# Patient Record
Sex: FEMALE | Race: WHITE | NOT HISPANIC OR LATINO
[De-identification: names, ages, dates, MRNs, and addresses within clinical notes are randomized per-mention and may not be internally consistent; named-entity substitution may affect disease eponyms.]

---

## 2021-02-13 ENCOUNTER — TRANSCRIPTION ENCOUNTER (OUTPATIENT)
Age: 81
End: 2021-02-13

## 2021-07-12 PROBLEM — Z00.00 ENCOUNTER FOR PREVENTIVE HEALTH EXAMINATION: Status: ACTIVE | Noted: 2021-07-12

## 2021-08-17 ENCOUNTER — RESULT REVIEW (OUTPATIENT)
Age: 81
End: 2021-08-17

## 2021-08-17 ENCOUNTER — APPOINTMENT (OUTPATIENT)
Dept: UROLOGY | Facility: CLINIC | Age: 81
End: 2021-08-17

## 2021-09-10 ENCOUNTER — APPOINTMENT (OUTPATIENT)
Dept: UROLOGY | Facility: CLINIC | Age: 81
End: 2021-09-10
Payer: MEDICARE

## 2021-09-10 VITALS
RESPIRATION RATE: 16 BRPM | SYSTOLIC BLOOD PRESSURE: 195 MMHG | HEART RATE: 53 BPM | DIASTOLIC BLOOD PRESSURE: 74 MMHG | WEIGHT: 122 LBS

## 2021-09-10 DIAGNOSIS — Z86.79 PERSONAL HISTORY OF OTHER DISEASES OF THE CIRCULATORY SYSTEM: ICD-10-CM

## 2021-09-10 DIAGNOSIS — Z80.7 FAMILY HISTORY OF OTHER MALIGNANT NEOPLASMS OF LYMPHOID, HEMATOPOIETIC AND RELATED TISSUES: ICD-10-CM

## 2021-09-10 PROCEDURE — 99203 OFFICE O/P NEW LOW 30 MIN: CPT

## 2021-09-12 PROBLEM — Z86.79 HISTORY OF HYPERTENSION: Status: RESOLVED | Noted: 2021-09-10 | Resolved: 2021-09-12

## 2021-09-12 PROBLEM — Z80.7 FAMILY HISTORY OF HODGKIN'S LYMPHOMA: Status: ACTIVE | Noted: 2021-09-12

## 2021-09-12 RX ORDER — ATENOLOL 50 MG/1
50 TABLET ORAL
Refills: 0 | Status: ACTIVE | COMMUNITY

## 2021-09-12 NOTE — LETTER BODY
[FreeTextEntry1] : Tatiana Mckoy, \par [address]\[place] [address]\par \par Dear Dr. Mckoy,\par \par Isaura Ma presents to the office today.  As you know, she is an 81-year-old woman who was referred for evaluation of microscopic hematuria.  The patient has undergone a urinalysis fairly recently that showed 6 red blood cells per high-power field.  She was advised to have urologic evaluation.  She denies any prior history of gross hematuria or dysuria.  She is not currently having symptoms of urgency, frequency, or incontinence.  She denies suprapubic or perineal pain.  She also denies flank pain, fever, chill, nausea or vomiting.  She has no history of kidney stones.\par \par The patient has history of hypertension and hypothyroidism.  She has prior surgical history of undergoing hysterectomy and BSO about 40 years ago.  She has a prior smoking history that was brief when she was very young and has not smoked for about 55 years.\par \par I reviewed her history of urinalyses.  She has a most recent urinalysis which is negative for blood and she has a track record of undergoing multiple urinalyses over the last few years.  There was only one that showed the 6 red blood cells and on that same urinalysis, there was the presence of white blood cells suggesting possible inflammation at the time.  Given that she is completely asymptomatic and without any prior history of gross hematuria, I think it would be a relatively soft call to initiate hematuria work-up at this time and I think that observation here is most appropriate.  I would recommend that she can continue to undergo screening with urinalysis on a periodic basis but I do not think she needs to undergo either imaging or cystoscopy at this time.\par \par I did advise her that if she is to have any findings in the future of gross hematuria, I would certainly see her back for imaging and cystoscopy evaluation.  If she has repetitive issues of microscopic hematuria, that work-up may also be indicated but I do not see enough indication at this time to pursue it.\par \par Thank you very much for the kind referral and please do not hesitate to contact me with any questions or concerns.\par \par Sincerely,\par \par \par \par \par Willian Sanchez MD, FACS\par  of Urology\par Residency \par [facility]\par \par [facility]\par Director of Robotics and Minimally Invasive Surgery\par [address] [place] [address]\par [place] [id number]\par P: [phone]\par F: [phone]\par [facility]

## 2022-02-15 ENCOUNTER — EMERGENCY (EMERGENCY)
Facility: HOSPITAL | Age: 82
LOS: 1 days | Discharge: HOME | End: 2022-02-15
Attending: EMERGENCY MEDICINE
Payer: COMMERCIAL

## 2022-02-15 ENCOUNTER — APPOINTMENT (OUTPATIENT)
Dept: VASCULAR SURGERY | Facility: CLINIC | Age: 82
End: 2022-02-15
Payer: MEDICARE

## 2022-02-15 VITALS
HEIGHT: 61.5 IN | WEIGHT: 126.1 LBS | RESPIRATION RATE: 18 BRPM | HEART RATE: 62 BPM | SYSTOLIC BLOOD PRESSURE: 164 MMHG | OXYGEN SATURATION: 99 % | DIASTOLIC BLOOD PRESSURE: 76 MMHG | TEMPERATURE: 98 F

## 2022-02-15 VITALS
WEIGHT: 126 LBS | HEART RATE: 54 BPM | BODY MASS INDEX: 23.19 KG/M2 | HEIGHT: 62 IN | SYSTOLIC BLOOD PRESSURE: 139 MMHG | DIASTOLIC BLOOD PRESSURE: 76 MMHG | TEMPERATURE: 96.62 F

## 2022-02-15 DIAGNOSIS — R93.89 ABNORMAL FINDINGS ON DIAGNOSTIC IMAGING OF OTHER SPECIFIED BODY STRUCTURES: ICD-10-CM

## 2022-02-15 LAB
ALBUMIN SERPL ELPH-MCNC: 4 G/DL — SIGNIFICANT CHANGE UP (ref 3.3–5)
ALP SERPL-CCNC: 65 U/L — SIGNIFICANT CHANGE UP (ref 40–120)
ALT FLD-CCNC: 13 U/L — SIGNIFICANT CHANGE UP (ref 10–45)
ANION GAP SERPL CALC-SCNC: 12 MMOL/L — SIGNIFICANT CHANGE UP (ref 5–17)
APTT BLD: 24.8 SEC — LOW (ref 27.5–35.5)
AST SERPL-CCNC: 21 U/L — SIGNIFICANT CHANGE UP (ref 10–40)
BASOPHILS # BLD AUTO: 0.07 K/UL — SIGNIFICANT CHANGE UP (ref 0–0.2)
BASOPHILS NFR BLD AUTO: 0.9 % — SIGNIFICANT CHANGE UP (ref 0–2)
BILIRUB SERPL-MCNC: 0.4 MG/DL — SIGNIFICANT CHANGE UP (ref 0.2–1.2)
BUN SERPL-MCNC: 18 MG/DL — SIGNIFICANT CHANGE UP (ref 7–23)
CALCIUM SERPL-MCNC: 9.1 MG/DL — SIGNIFICANT CHANGE UP (ref 8.4–10.5)
CHLORIDE SERPL-SCNC: 103 MMOL/L — SIGNIFICANT CHANGE UP (ref 96–108)
CO2 SERPL-SCNC: 24 MMOL/L — SIGNIFICANT CHANGE UP (ref 22–31)
CREAT SERPL-MCNC: 0.84 MG/DL — SIGNIFICANT CHANGE UP (ref 0.5–1.3)
EOSINOPHIL # BLD AUTO: 0.49 K/UL — SIGNIFICANT CHANGE UP (ref 0–0.5)
EOSINOPHIL NFR BLD AUTO: 6.6 % — HIGH (ref 0–6)
GLUCOSE SERPL-MCNC: 92 MG/DL — SIGNIFICANT CHANGE UP (ref 70–99)
HCT VFR BLD CALC: 37.3 % — SIGNIFICANT CHANGE UP (ref 34.5–45)
HGB BLD-MCNC: 11.8 G/DL — SIGNIFICANT CHANGE UP (ref 11.5–15.5)
IMM GRANULOCYTES NFR BLD AUTO: 0.5 % — SIGNIFICANT CHANGE UP (ref 0–1.5)
INR BLD: 0.93 RATIO — SIGNIFICANT CHANGE UP (ref 0.88–1.16)
LYMPHOCYTES # BLD AUTO: 2.64 K/UL — SIGNIFICANT CHANGE UP (ref 1–3.3)
LYMPHOCYTES # BLD AUTO: 35.3 % — SIGNIFICANT CHANGE UP (ref 13–44)
MCHC RBC-ENTMCNC: 31.1 PG — SIGNIFICANT CHANGE UP (ref 27–34)
MCHC RBC-ENTMCNC: 31.6 GM/DL — LOW (ref 32–36)
MCV RBC AUTO: 98.2 FL — SIGNIFICANT CHANGE UP (ref 80–100)
MONOCYTES # BLD AUTO: 0.82 K/UL — SIGNIFICANT CHANGE UP (ref 0–0.9)
MONOCYTES NFR BLD AUTO: 11 % — SIGNIFICANT CHANGE UP (ref 2–14)
NEUTROPHILS # BLD AUTO: 3.42 K/UL — SIGNIFICANT CHANGE UP (ref 1.8–7.4)
NEUTROPHILS NFR BLD AUTO: 45.7 % — SIGNIFICANT CHANGE UP (ref 43–77)
NRBC # BLD: 0 /100 WBCS — SIGNIFICANT CHANGE UP (ref 0–0)
NT-PROBNP SERPL-SCNC: 1097 PG/ML — HIGH (ref 0–300)
PLATELET # BLD AUTO: 195 K/UL — SIGNIFICANT CHANGE UP (ref 150–400)
POTASSIUM SERPL-MCNC: 3.9 MMOL/L — SIGNIFICANT CHANGE UP (ref 3.5–5.3)
POTASSIUM SERPL-SCNC: 3.9 MMOL/L — SIGNIFICANT CHANGE UP (ref 3.5–5.3)
PROT SERPL-MCNC: 7.6 G/DL — SIGNIFICANT CHANGE UP (ref 6–8.3)
PROTHROM AB SERPL-ACNC: 11.2 SEC — SIGNIFICANT CHANGE UP (ref 10.6–13.6)
RBC # BLD: 3.8 M/UL — SIGNIFICANT CHANGE UP (ref 3.8–5.2)
RBC # FLD: 13.7 % — SIGNIFICANT CHANGE UP (ref 10.3–14.5)
SARS-COV-2 RNA SPEC QL NAA+PROBE: SIGNIFICANT CHANGE UP
SODIUM SERPL-SCNC: 139 MMOL/L — SIGNIFICANT CHANGE UP (ref 135–145)
TROPONIN T, HIGH SENSITIVITY RESULT: 17 NG/L — SIGNIFICANT CHANGE UP (ref 0–51)
WBC # BLD: 7.48 K/UL — SIGNIFICANT CHANGE UP (ref 3.8–10.5)
WBC # FLD AUTO: 7.48 K/UL — SIGNIFICANT CHANGE UP (ref 3.8–10.5)

## 2022-02-15 PROCEDURE — 93978 VASCULAR STUDY: CPT

## 2022-02-15 PROCEDURE — 74174 CTA ABD&PLVS W/CONTRAST: CPT | Mod: 26,MA

## 2022-02-15 PROCEDURE — 99220: CPT

## 2022-02-15 PROCEDURE — 99213 OFFICE O/P EST LOW 20 MIN: CPT

## 2022-02-15 PROCEDURE — 93010 ELECTROCARDIOGRAM REPORT: CPT

## 2022-02-15 PROCEDURE — 71275 CT ANGIOGRAPHY CHEST: CPT | Mod: 26,MA

## 2022-02-15 NOTE — CONSULT NOTE ADULT - ATTENDING COMMENTS
cta reviewed.  no aortic thrombus.  no further vascular intervention/workup needed at this time.   outpatient follow up.

## 2022-02-15 NOTE — ED PROCEDURE NOTE - PROCEDURE ADDITIONAL DETAILS
Emergency Department Focused Ultrasound performed at patient's bedside for educational purposes. An appropriate follow up study is ordered. -Alonso Pérez PA-C

## 2022-02-15 NOTE — ED CDU PROVIDER INITIAL DAY NOTE - ATTENDING CONTRIBUTION TO CARE
I have personally performed a face to face diagnostic evaluation on this patient.  I have reviewed the ACP note and agree with the history, exam, and plan of care, except as noted.  History and Exam by me shows  See Ed provider note  Pato Allred MD, Facep

## 2022-02-15 NOTE — ED PROVIDER NOTE - OBJECTIVE STATEMENT
81 year old F with PMH HTN, hypothyroidism, HLD, uterine cancer s/p hysterectomy referred from vascular surgeon for r/o aortic thombus. Patient was evaluated by PCP in august, told she needed to go see a vascular surgeon. However, never received the message. Was reviewing her discharge paperwork and saw she was supposed to follow up, so comes in today. Was referred from Dr. Rosibel Leger 81 year old F with PMH HTN, hypothyroidism, HLD, uterine cancer s/p hysterectomy referred from vascular surgeon for r/o aortic thrombus. Patient was evaluated by PCP in august, told she needed to go see a vascular surgeon. However, never received the message. Was reviewing her discharge paperwork and saw she was supposed to follow up, so comes in today. Was referred from Dr. Rosibel Leger. Denies CP, SOB, palpitations, LE edema, syncope, N/V/D, abdominal pain, history of PE or DVT.

## 2022-02-15 NOTE — CONSULT NOTE ADULT - SUBJECTIVE AND OBJECTIVE BOX
SURGERY CONSULT NOTE    HPI:      PAST MEDICAL & SURGICAL HISTORY:      MEDICATIONS  (STANDING):    MEDICATIONS  (PRN):      Allergies    No Known Allergies    Intolerances        SOCIAL HISTORY:    FAMILY HISTORY:      Physical Exam:  General: NAD, resting comfortably  HEENT: NC/AT, EOMI, normal hearing, no oral lesions, no LAD, neck supple  Pulmonary: normal resp effort, CTA-B  Cardiovascular: NSR, no murmurs  Abdominal: soft, ND/NT, no organomegaly  Extremities: WWP, normal strength, no clubbing/cyanosis/edema  Neuro: A/O x 3, CNs II-XII grossly intact, normal sensation, no focal deficits  Pulses: palpable distal pulses    Vital Signs Last 24 Hrs  T(C): 36.7 (15 Feb 2022 18:25), Max: 36.7 (15 Feb 2022 18:25)  T(F): 98 (15 Feb 2022 18:25), Max: 98 (15 Feb 2022 18:25)  HR: 68 (15 Feb 2022 18:25) (62 - 72)  BP: 187/77 (15 Feb 2022 18:25) (164/76 - 195/80)  BP(mean): --  RR: 16 (15 Feb 2022 18:25) (16 - 18)  SpO2: 100% (15 Feb 2022 18:25) (99% - 100%)    I&O's Summary          LABS:                        11.8   7.48  )-----------( 195      ( 15 Feb 2022 18:26 )             37.3     02-15    139  |  103  |  18  ----------------------------<  92  3.9   |  24  |  0.84    Ca    9.1      15 Feb 2022 18:26    TPro  7.6  /  Alb  4.0  /  TBili  0.4  /  DBili  x   /  AST  21  /  ALT  13  /  AlkPhos  65  02-15    PT/INR - ( 15 Feb 2022 18:26 )   PT: 11.2 sec;   INR: 0.93 ratio         PTT - ( 15 Feb 2022 18:26 )  PTT:24.8 sec    CAPILLARY BLOOD GLUCOSE        LIVER FUNCTIONS - ( 15 Feb 2022 18:26 )  Alb: 4.0 g/dL / Pro: 7.6 g/dL / ALK PHOS: 65 U/L / ALT: 13 U/L / AST: 21 U/L / GGT: x             Cultures:      RADIOLOGY & ADDITIONAL STUDIES:      Plan:           SURGERY CONSULT NOTE    HPI: Pt is 82 y/o F with PMHx HTN, HLD, hypothyroidism, RANDY ~40 yrs ago for uterine CA, presents to ED today after reviewing paperwork from Aug 2021 stating there was finding of aortic thrombus during annual ultrasound for AAA screening in 08/2021 and pt should f/u with vascular surgeon but did not receive message from MD at that time. Pt denies fever, chills, CP, SOB, N/V/D/C, lightheadedness, dizziness, HA, visual changes. Denies blood in urine and stool, urinary symptoms. Pt has NKDA. Takes following home medications as prescribed- atenolol 50 mg QD, simvastatin 20 mg QD bedtime, Levothyroxine 50 microgram QD, escitalopram 5 mg QD      PAST MEDICAL & SURGICAL HISTORY:      MEDICATIONS  (STANDING):    MEDICATIONS  (PRN):      Allergies    No Known Allergies    Intolerances        SOCIAL HISTORY:    FAMILY HISTORY:      Physical Exam:  General: NAD, resting comfortably  HEENT: NC/AT, EOMI, normal hearing, no oral lesions, no LAD, neck supple  Pulmonary: normal resp effort, CTA-B  Cardiovascular: NSR, no murmurs  Abdominal: soft, ND/NT, no organomegaly  Extremities: WWP, normal strength, no clubbing/cyanosis/edema  Neuro: A/O x 3, CNs II-XII grossly intact, normal sensation, no focal deficits  Pulses: palpable distal pulses    Vital Signs Last 24 Hrs  T(C): 36.7 (15 Feb 2022 18:25), Max: 36.7 (15 Feb 2022 18:25)  T(F): 98 (15 Feb 2022 18:25), Max: 98 (15 Feb 2022 18:25)  HR: 68 (15 Feb 2022 18:25) (62 - 72)  BP: 187/77 (15 Feb 2022 18:25) (164/76 - 195/80)  BP(mean): --  RR: 16 (15 Feb 2022 18:25) (16 - 18)  SpO2: 100% (15 Feb 2022 18:25) (99% - 100%)    I&O's Summary          LABS:                        11.8   7.48  )-----------( 195      ( 15 Feb 2022 18:26 )             37.3     02-15    139  |  103  |  18  ----------------------------<  92  3.9   |  24  |  0.84    Ca    9.1      15 Feb 2022 18:26    TPro  7.6  /  Alb  4.0  /  TBili  0.4  /  DBili  x   /  AST  21  /  ALT  13  /  AlkPhos  65  02-15    PT/INR - ( 15 Feb 2022 18:26 )   PT: 11.2 sec;   INR: 0.93 ratio         PTT - ( 15 Feb 2022 18:26 )  PTT:24.8 sec    CAPILLARY BLOOD GLUCOSE        LIVER FUNCTIONS - ( 15 Feb 2022 18:26 )  Alb: 4.0 g/dL / Pro: 7.6 g/dL / ALK PHOS: 65 U/L / ALT: 13 U/L / AST: 21 U/L / GGT: x             Cultures:      RADIOLOGY & ADDITIONAL STUDIES:      Plan:           SURGERY CONSULT NOTE    HPI: Pt is 82 y/o F with PMHx HTN, HLD, hypothyroidism, RANDY ~40 yrs ago for uterine CA, presents to ED today after reviewing paperwork from Aug 2021 stating there was finding of aortic thrombus during annual ultrasound for AAA screening in 08/2021. Family hx of AAA, sister and mother with a family member passing away from it, so undergoes annual screening. Possible thrombus noted during 8/2021 outpatient encounter, however, only realized she needed to follow up after re-reading paperwork recently. At time of ecnoutner pt denies fever, chills, CP, SOB, N/V/D/C, lightheadedness, dizziness, HA, visual changes. Denies blood in urine and stool. Pt has NKDA.       PAST MEDICAL & SURGICAL HISTORY:      MEDICATIONS  (STANDING):    MEDICATIONS  (PRN):      Allergies    No Known Allergies    Intolerances        SOCIAL HISTORY:    FAMILY HISTORY:      Physical Exam:  General: NAD, resting comfortably  HEENT: NC/AT, EOMI, normal hearing, no oral lesions, no LAD, neck supple  Pulmonary: normal resp effort, CTA-B  Cardiovascular: NSR, no murmurs  Abdominal: soft, ND/NT, no organomegaly  Extremities: WWP, normal strength, no clubbing/cyanosis/edema  Neuro: A/O x 3, CNs II-XII grossly intact, normal sensation, no focal deficits  Pulses: palpable distal pulses    Vital Signs Last 24 Hrs  T(C): 36.7 (15 Feb 2022 18:25), Max: 36.7 (15 Feb 2022 18:25)  T(F): 98 (15 Feb 2022 18:25), Max: 98 (15 Feb 2022 18:25)  HR: 68 (15 Feb 2022 18:25) (62 - 72)  BP: 187/77 (15 Feb 2022 18:25) (164/76 - 195/80)  BP(mean): --  RR: 16 (15 Feb 2022 18:25) (16 - 18)  SpO2: 100% (15 Feb 2022 18:25) (99% - 100%)    I&O's Summary          LABS:                        11.8   7.48  )-----------( 195      ( 15 Feb 2022 18:26 )             37.3     02-15    139  |  103  |  18  ----------------------------<  92  3.9   |  24  |  0.84    Ca    9.1      15 Feb 2022 18:26    TPro  7.6  /  Alb  4.0  /  TBili  0.4  /  DBili  x   /  AST  21  /  ALT  13  /  AlkPhos  65  02-15    PT/INR - ( 15 Feb 2022 18:26 )   PT: 11.2 sec;   INR: 0.93 ratio         PTT - ( 15 Feb 2022 18:26 )  PTT:24.8 sec    CAPILLARY BLOOD GLUCOSE        LIVER FUNCTIONS - ( 15 Feb 2022 18:26 )  Alb: 4.0 g/dL / Pro: 7.6 g/dL / ALK PHOS: 65 U/L / ALT: 13 U/L / AST: 21 U/L / GGT: x             Cultures:      RADIOLOGY & ADDITIONAL STUDIES:  < from: CT Angio Abdomen and Pelvis w/ IV Cont (02.15.22 @ 20:05) >    FINDINGS:  CHEST:  LUNGS AND LARGE AIRWAYS: Patent central airways. Clear lungs.  PLEURA: No pleural effusion.  VESSELS: No intramural hematomaor aortic dissection. Calcifications of   the aorta.  Mixed plaque within the LAD causes at least moderate and   possibly severe stenosis.  HEART: Heart size is normal. No pericardial effusion.  MEDIASTINUM AND DANII: No lymphadenopathy.  CHEST WALL AND LOWER NECK: Within normal limits.    ABDOMEN AND PELVIS:  LIVER: Within normal limits.  BILE DUCTS: Normal caliber.  GALLBLADDER: Within normal limits.  SPLEEN: Within normal limits.  PANCREAS: Within normal limits.  ADRENALS: Within normal limits.  KIDNEYS/URETERS: Within normal limits.    BLADDER: Within normal limits.  REPRODUCTIVE ORGANS: Hysterectomy.    BOWEL: No bowel obstruction. Appendix is not visualized. No evidence of   inflammation in the pericecal region.  PERITONEUM: No ascites.  VESSELS: Atherosclerotic changes.  RETROPERITONEUM/LYMPH NODES: No lymphadenopathy.  ABDOMINAL WALL: Within normal limits.  BONES: Degenerative changes. Status post vertebroplasty of the T9-T10 and   T10-T11 intervertebral disc spaces.    IMPRESSION:  Mixed plaque within the LAD causes at least moderate and possibly severe   stenosis.    < end of copied text >        Plan:  Pt is 82 y/o F with PMHx HTN, HLD, hypothyroidism, RANDY ~40 yrs ago for uterine CA, presents to ED today to r/o aortic thrombus after possible finding on outpatient U/S for annual AAA screening.  CTA findings r/o aortic thrombus however read notes mixed plaque in LAD.    - no acute vascular intervention required at this time  - would recommend cardiology evaluate patient during this visit for LAD plaque    d/w Dr. Nelson, vascular fellow    Vascular Surgery, [id number]       SURGERY CONSULT NOTE    HPI: Pt is 80 y/o F with PMHx HTN, HLD, hypothyroidism, RANDY ~40 yrs ago for uterine CA, presents to ED today after reviewing paperwork from Aug 2021 stating there was finding of aortic thrombus during annual ultrasound for AAA screening in 08/2021. Family hx of AAA, sister and mother with a family member passing away from it, so undergoes annual screening. Possible thrombus noted during 8/2021 outpatient encounter, however, only realized she needed to follow up after re-reading paperwork recently. At time of ecnoutner pt denies fever, chills, CP, SOB, N/V/D/C, lightheadedness, dizziness, HA, visual changes. Denies blood in urine and stool. Pt has NKDA.       PAST MEDICAL & SURGICAL HISTORY:      MEDICATIONS  (STANDING):    MEDICATIONS  (PRN):      Allergies    No Known Allergies    Intolerances        SOCIAL HISTORY:    FAMILY HISTORY:      Physical Exam:  General: NAD, resting comfortably  HEENT: NC/AT, EOMI, normal hearing, no oral lesions, no LAD, neck supple  Pulmonary: normal resp effort, CTA-B  Cardiovascular: NSR, no murmurs  Abdominal: soft, ND/NT, no organomegaly  Extremities: WWP, normal strength, no clubbing/cyanosis/edema  Neuro: A/O x 3, CNs II-XII grossly intact, normal sensation, no focal deficits  Pulses: palpable distal pulses    Vital Signs Last 24 Hrs  T(C): 36.7 (15 Feb 2022 18:25), Max: 36.7 (15 Feb 2022 18:25)  T(F): 98 (15 Feb 2022 18:25), Max: 98 (15 Feb 2022 18:25)  HR: 68 (15 Feb 2022 18:25) (62 - 72)  BP: 187/77 (15 Feb 2022 18:25) (164/76 - 195/80)  BP(mean): --  RR: 16 (15 Feb 2022 18:25) (16 - 18)  SpO2: 100% (15 Feb 2022 18:25) (99% - 100%)    I&O's Summary          LABS:                        11.8   7.48  )-----------( 195      ( 15 Feb 2022 18:26 )             37.3     02-15    139  |  103  |  18  ----------------------------<  92  3.9   |  24  |  0.84    Ca    9.1      15 Feb 2022 18:26    TPro  7.6  /  Alb  4.0  /  TBili  0.4  /  DBili  x   /  AST  21  /  ALT  13  /  AlkPhos  65  02-15    PT/INR - ( 15 Feb 2022 18:26 )   PT: 11.2 sec;   INR: 0.93 ratio         PTT - ( 15 Feb 2022 18:26 )  PTT:24.8 sec    CAPILLARY BLOOD GLUCOSE        LIVER FUNCTIONS - ( 15 Feb 2022 18:26 )  Alb: 4.0 g/dL / Pro: 7.6 g/dL / ALK PHOS: 65 U/L / ALT: 13 U/L / AST: 21 U/L / GGT: x             Cultures:      RADIOLOGY & ADDITIONAL STUDIES:  < from: CT Angio Abdomen and Pelvis w/ IV Cont (02.15.22 @ 20:05) >    FINDINGS:  CHEST:  LUNGS AND LARGE AIRWAYS: Patent central airways. Clear lungs.  PLEURA: No pleural effusion.  VESSELS: No intramural hematomaor aortic dissection. Calcifications of   the aorta.  Mixed plaque within the LAD causes at least moderate and   possibly severe stenosis.  HEART: Heart size is normal. No pericardial effusion.  MEDIASTINUM AND DANII: No lymphadenopathy.  CHEST WALL AND LOWER NECK: Within normal limits.    ABDOMEN AND PELVIS:  LIVER: Within normal limits.  BILE DUCTS: Normal caliber.  GALLBLADDER: Within normal limits.  SPLEEN: Within normal limits.  PANCREAS: Within normal limits.  ADRENALS: Within normal limits.  KIDNEYS/URETERS: Within normal limits.    BLADDER: Within normal limits.  REPRODUCTIVE ORGANS: Hysterectomy.    BOWEL: No bowel obstruction. Appendix is not visualized. No evidence of   inflammation in the pericecal region.  PERITONEUM: No ascites.  VESSELS: Atherosclerotic changes.  RETROPERITONEUM/LYMPH NODES: No lymphadenopathy.  ABDOMINAL WALL: Within normal limits.  BONES: Degenerative changes. Status post vertebroplasty of the T9-T10 and   T10-T11 intervertebral disc spaces.    IMPRESSION:  Mixed plaque within the LAD causes at least moderate and possibly severe   stenosis.    < end of copied text >        Plan:  Pt is 80 y/o F with PMHx HTN, HLD, hypothyroidism, RANDY ~40 yrs ago for uterine CA, presents to ED today to r/o aortic thrombus after possible finding on outpatient U/S for annual AAA screening.  CTA findings r/o aortic thrombus however read notes mixed plaque in LAD.    - no acute vascular intervention required at this time  - would recommend cardiology evaluate patient during this visit for LAD plaque    d/w Dr. Awan, vascular fellow    Vascular Surgery, [id number]

## 2022-02-15 NOTE — ED CDU PROVIDER INITIAL DAY NOTE - OBJECTIVE STATEMENT
81 year old F with PMH HTN, hypothyroidism, HLD, uterine cancer s/p hysterectomy referred from vascular surgeon for r/o aortic thrombus. Patient was evaluated by PCP in august, told she needed to go see a vascular surgeon. However, never received the message. Was reviewing her discharge paperwork and saw she was supposed to follow up, so comes in today. Was referred from Dr. Rosibel Leger. Denies CP, SOB, palpitations, LE edema, syncope, N/V/D, abdominal pain, history of PE or DVT.  No ED, patient had non ischemic ekg. Laboratory significant for elevated pron BNP 1097 and HsT 17--20. CT angio chest/abd/pel showed No intramural hematoma or aortic dissection. Calcifications of the aorta. Mixed plaque within the LAD causes at least moderate and possibly severe stenosis. Pt was evaluated by Vascular surgery, no acute vascular intervention required at this time, would recommend cardiology evaluate. Pt sent to CDU for frequent reeval, vitals q 4hrs, tele, TTE and Nuclear stress.

## 2022-02-15 NOTE — ED CDU PROVIDER INITIAL DAY NOTE - MEDICAL DECISION MAKING DETAILS
Eldelry female sent to ed for concerns of aortic disease noted 8/2021 and lost to follow up. Had seen vasc surg today and referred to ed.  Aorta is clean but ct of heart showed Severe calcifications in LAD. Pt in cdu for Cards cs and stress  Pato Allred MD, Facep

## 2022-02-15 NOTE — ED PROVIDER NOTE - CLINICAL SUMMARY MEDICAL DECISION MAKING FREE TEXT BOX
81 year old F with PMH HTN, hypothyroidism, HLD, uterine cancer s/p hysterectomy referred from vascular surgeon for r/o aortic thrombus. Afebrile, non toxic appearing, non tachypneic, non hypoxic. EKG with no ischemic changes. Will get labs, CTA C/A/P, vascular consult. Reassess.

## 2022-02-15 NOTE — ED CDU PROVIDER INITIAL DAY NOTE - DETAILS
81 year old F with PMH HTN, hypothyroidism, HLD, uterine cancer s/p hysterectomy referred from vascular surgeon for r/o aortic thrombus. Found to have Mixed plaque within the LAD causes at least moderate and possibly severe stenosis.  Plan: frequent reeval, vitals q 4hrs, tele, TTE and Nuclear stress.

## 2022-02-15 NOTE — ED ADULT NURSE NOTE - OBJECTIVE STATEMENT
Patient is an 80 y/o female with PMHx of HTN, HLD, thyroid disorder, uterine cancer s/p hysterectomy, coming to the ED for a CT scan of abdomen/pelvis to r/o aortic thrombus. Patient referred to ED by vascular doctor. Patient received a/o x 3, able to verbalize needs and follow commands. Patient breathing even and non-labored. RR WNL. SpO2 WNL on RA. Lungs CTA B/L. Patient denies any chest pain, palpitations, dizziness, SOB, or any complaints. Patient states she saw her doctor in August for a routine check up, had a test performed then (doesn't know exactly what), and was referred to ED back in September, but states she didn't receive the message to go to ED until today when she went for another routine physical, then went to see vascular doctor Dr. Leger who told her test in August was abnormal and she was supposed to go to ED at that time for CT scan so Dr. Leger sent her to ED today for a STAT CT scan of abdomen/pelvis to r/o aortic thrombus. Patient states both her mother and sister had problems with their aorta--states sister passed away from it. Abdomen soft, non-tender, non-distended. Patient denies any chest, abdominal or back pain. Labs sent, pending results. Patient resting comfortably in no acute distress. Plan of care d/w patient and patient verbalized understanding. Will continue to monitor.

## 2022-02-16 VITALS
DIASTOLIC BLOOD PRESSURE: 64 MMHG | RESPIRATION RATE: 20 BRPM | SYSTOLIC BLOOD PRESSURE: 131 MMHG | HEART RATE: 60 BPM | OXYGEN SATURATION: 98 %

## 2022-02-16 LAB
A1C WITH ESTIMATED AVERAGE GLUCOSE RESULT: 5.9 % — HIGH (ref 4–5.6)
CHOLEST SERPL-MCNC: 166 MG/DL — SIGNIFICANT CHANGE UP
ESTIMATED AVERAGE GLUCOSE: 123 MG/DL — HIGH (ref 68–114)
HDLC SERPL-MCNC: 78 MG/DL — SIGNIFICANT CHANGE UP
LIPID PNL WITH DIRECT LDL SERPL: 72 MG/DL — SIGNIFICANT CHANGE UP
NON HDL CHOLESTEROL: 89 MG/DL — SIGNIFICANT CHANGE UP
TRIGL SERPL-MCNC: 84 MG/DL — SIGNIFICANT CHANGE UP
TROPONIN T, HIGH SENSITIVITY RESULT: 20 NG/L — SIGNIFICANT CHANGE UP (ref 0–51)

## 2022-02-16 PROCEDURE — 80053 COMPREHEN METABOLIC PANEL: CPT

## 2022-02-16 PROCEDURE — 74174 CTA ABD&PLVS W/CONTRAST: CPT | Mod: MA

## 2022-02-16 PROCEDURE — 83036 HEMOGLOBIN GLYCOSYLATED A1C: CPT

## 2022-02-16 PROCEDURE — G0378: CPT

## 2022-02-16 PROCEDURE — 80061 LIPID PANEL: CPT

## 2022-02-16 PROCEDURE — 71275 CT ANGIOGRAPHY CHEST: CPT | Mod: MA

## 2022-02-16 PROCEDURE — U0005: CPT

## 2022-02-16 PROCEDURE — 36415 COLL VENOUS BLD VENIPUNCTURE: CPT

## 2022-02-16 PROCEDURE — 85730 THROMBOPLASTIN TIME PARTIAL: CPT

## 2022-02-16 PROCEDURE — 93306 TTE W/DOPPLER COMPLETE: CPT | Mod: 26

## 2022-02-16 PROCEDURE — 99217: CPT

## 2022-02-16 PROCEDURE — 85610 PROTHROMBIN TIME: CPT

## 2022-02-16 PROCEDURE — 93306 TTE W/DOPPLER COMPLETE: CPT

## 2022-02-16 PROCEDURE — 83880 ASSAY OF NATRIURETIC PEPTIDE: CPT

## 2022-02-16 PROCEDURE — 84484 ASSAY OF TROPONIN QUANT: CPT

## 2022-02-16 PROCEDURE — 99204 OFFICE O/P NEW MOD 45 MIN: CPT

## 2022-02-16 PROCEDURE — 85025 COMPLETE CBC W/AUTO DIFF WBC: CPT

## 2022-02-16 PROCEDURE — U0003: CPT

## 2022-02-16 PROCEDURE — 99285 EMERGENCY DEPT VISIT HI MDM: CPT | Mod: 25

## 2022-02-16 PROCEDURE — 93005 ELECTROCARDIOGRAM TRACING: CPT

## 2022-02-16 RX ORDER — AMLODIPINE BESYLATE 2.5 MG/1
2.5 TABLET ORAL ONCE
Refills: 0 | Status: COMPLETED | OUTPATIENT
Start: 2022-02-16 | End: 2022-02-16

## 2022-02-16 RX ORDER — ATORVASTATIN CALCIUM 80 MG/1
1 TABLET, FILM COATED ORAL
Qty: 14 | Refills: 0
Start: 2022-02-16 | End: 2022-03-01

## 2022-02-16 RX ORDER — SIMVASTATIN 20 MG/1
20 TABLET, FILM COATED ORAL ONCE
Refills: 0 | Status: COMPLETED | OUTPATIENT
Start: 2022-02-16 | End: 2022-02-16

## 2022-02-16 RX ORDER — ESCITALOPRAM OXALATE 10 MG/1
5 TABLET, FILM COATED ORAL DAILY
Refills: 0 | Status: DISCONTINUED | OUTPATIENT
Start: 2022-02-16 | End: 2022-02-19

## 2022-02-16 RX ORDER — ATENOLOL 25 MG/1
50 TABLET ORAL ONCE
Refills: 0 | Status: COMPLETED | OUTPATIENT
Start: 2022-02-16 | End: 2022-02-16

## 2022-02-16 RX ORDER — LEVOTHYROXINE SODIUM 125 MCG
50 TABLET ORAL DAILY
Refills: 0 | Status: DISCONTINUED | OUTPATIENT
Start: 2022-02-16 | End: 2022-02-19

## 2022-02-16 RX ORDER — HYDRALAZINE HCL 50 MG
10 TABLET ORAL ONCE
Refills: 0 | Status: COMPLETED | OUTPATIENT
Start: 2022-02-16 | End: 2022-02-16

## 2022-02-16 RX ADMIN — SIMVASTATIN 20 MILLIGRAM(S): 20 TABLET, FILM COATED ORAL at 02:15

## 2022-02-16 RX ADMIN — ATENOLOL 50 MILLIGRAM(S): 25 TABLET ORAL at 10:46

## 2022-02-16 RX ADMIN — Medication 50 MICROGRAM(S): at 06:24

## 2022-02-16 NOTE — ED CDU PROVIDER SUBSEQUENT DAY NOTE - HISTORY
CDU PROGRESS NOTE MARIJA GARG: Pt resting comfortably, feeling well without complaint. NAD, VSS. c/d/w Cardiology fellow, agree w/ plan for Stress in am. discuss w/ CDU attending in am.

## 2022-02-16 NOTE — ED CDU PROVIDER DISPOSITION NOTE - CLINICAL COURSE
· HPI Objective Statement: 81 year old F with PMH HTN, hypothyroidism, HLD, uterine cancer s/p hysterectomy referred from vascular surgeon for r/o aortic thrombus. Patient was evaluated by PCP in august, told she needed to go see a vascular surgeon. However, never received the message. Was reviewing her discharge paperwork and saw she was supposed to follow up, so comes in today. Was referred from Dr. Rosibel Leger. Denies CP, SOB, palpitations, LE edema, syncope, N/V/D, abdominal pain, history of PE or DVT.  No ED, patient had non ischemic ekg. Laboratory significant for elevated pron BNP 1097 and HsT 17--20. CT angio chest/abd/pel showed No intramural hematoma or aortic dissection. Calcifications of the aorta. Mixed plaque within the LAD causes at least moderate and possibly severe stenosis. Pt was evaluated by Vascular surgery, no acute vascular intervention required at this time, would recommend cardiology evaluate. Pt sent to CDU for frequent reeval, vitals q 4hrs, tele, TTE and Nuclear stress. · HPI Objective Statement: 81 year old F with PMH HTN, hypothyroidism, HLD, uterine cancer s/p hysterectomy referred from vascular surgeon for r/o aortic thrombus. Patient was evaluated by PCP in august, told she needed to go see a vascular surgeon. However, never received the message. Was reviewing her discharge paperwork and saw she was supposed to follow up, so comes in today. Was referred from Dr. Rosibel Leger. Denies CP, SOB, palpitations, LE edema, syncope, N/V/D, abdominal pain, history of PE or DVT.  No ED, patient had non ischemic ekg. Laboratory significant for elevated pron BNP 1097 and HsT 17--20. CT angio chest/abd/pel showed No intramural hematoma or aortic dissection. Calcifications of the aorta. Mixed plaque within the LAD causes at least moderate and possibly severe stenosis. Pt was evaluated by Vascular surgery, no acute vascular intervention required at this time, would recommend cardiology evaluate. Pt sent to CDU for frequent reeval, vitals q 4hrs, tele, TTE and Nuclear stress.  no events on tele. echo done- hyperdynamic. nuc stress cancelled at recommendation of cards. per cards attending Dr. Lopez, pt stable for d/c home

## 2022-02-16 NOTE — CONSULT NOTE ADULT - SUBJECTIVE AND OBJECTIVE BOX
02-16-22 @ 09:58  [facility]-[id number]    CHIEF COMPLAINT:  Ms. Isaura Ma is a very pleasant 81 year old woman with a history of hypertension and a previous PCI to the LCX. She was referred to the [facility] emergency department over concern for an arterial thrombus in the chest which has already been ruled-out via CT-scan.. An incidental finding of a mid-LAD plaque was noted in this non-coronary gated CT study. An echocardiogram was performed this morning, which I personally reviewed; it is a normal study.     Ms. Ma does not experience any chest pain/pressure. She lives in a home which has stairs; she ascends these regularly with no difficulty.   There is no history of cigarette smoking or diabetes.     Allergies: No Known Allergies      PAST MEDICAL & SURGICAL HISTORY:  Hypertension  Hypothyroid  HLD (hyperlipidemia)  CAD (as above_    No significant past surgical history        MEDICATIONS  (STANDING):  escitalopram 5 milliGRAM(s) Oral daily  levothyroxine 50 MICROGram(s) Oral daily  simvastatin (dose?)  aspirin    MEDICATIONS  (PRN):      REVIEW OF SYSTEMS:  CONSTITUTIONAL: No fever, weight loss, or fatigue  EYES: No eye pain, visual disturbances, or discharge  ENMT:  No difficulty hearing, tinnitus, vertigo; No sinus or throat pain  NECK: No pain or stiffness  RESPIRATORY: No cough, wheezing, chills or hemoptysis; No Shortness of Breath  CARDIOVASCULAR: No chest pain, palpitations, passing out, dizziness, or leg swelling  GASTROINTESTINAL: No abdominal or epigastric pain. No nausea, vomiting, or hematemesis; No diarrhea or constipation. No melena or hematochezia.  GENITOURINARY: No dysuria, frequency, hematuria, or incontinence  NEUROLOGICAL: No headaches, memory loss, loss of strength, numbness, or tremors  SKIN: No itching, burning, rashes, or lesions   LYMPH Nodes: No enlarged glands  ENDOCRINE: No heat or cold intolerance; No hair loss  MUSCULOSKELETAL: No joint pain or swelling; No muscle, back, or extremity pain  PSYCHIATRIC: No depression, anxiety, mood swings, or difficulty sleeping  HEME/LYMPH: No easy bruising, or bleeding gums  ALLERY AND IMMUNOLOGIC: No hives or eczema	    [ ] All others negative	  [ ] Unable to obtain    I&O's Summary      PHYSICAL EXAM:  Vital Signs Last 24 Hrs  T(C): 36.8 (16 Feb 2022 07:45), Max: 36.9 (16 Feb 2022 01:53)  T(F): 98.2 (16 Feb 2022 07:45), Max: 98.5 (16 Feb 2022 01:53)  HR: 64 (16 Feb 2022 07:45) (59 - 72)  BP: 177/69 (16 Feb 2022 07:45) (128/59 - 195/80)  BP(mean): --  RR: 18 (16 Feb 2022 07:45) (16 - 18)  SpO2: 97% (16 Feb 2022 07:45) (97% - 100%)    Orthostatic VS      Daily Height in cm: 156.21 (15 Feb 2022 16:29)    Daily     Appearance: Normal	  HEENT:   Normal oral mucosa, PERRL, EOMI	  Lymphatic: No lymphadenopathy  Cardiovascular: Normal S1 S2, No JVD, No murmurs, No edema  Respiratory: Lungs clear to auscultation	  Psychiatry: A & O x 3, Mood & affect appropriate  Gastrointestinal:  Soft, Non-tender, + BS	  Skin: No rashes, No ecchymoses, No cyanosis	  Neurologic: Non-focal  Extremities: Normal range of motion, No clubbing, cyanosis or edema  Vascular: Peripheral pulses palpable 2+ bilaterally    LABS:	 	                        11.8   7.48  )-----------( 195      ( 15 Feb 2022 18:26 )             37.3       02-15    139  |  103  |  18  ----------------------------<  92  3.9   |  24  |  0.84    Ca    9.1      15 Feb 2022 18:26    TPro  7.6  /  Alb  4.0  /  TBili  0.4  /  DBili  x   /  AST  21  /  ALT  13  /  AlkPhos  65  02-15      PT/INR - ( 15 Feb 2022 18:26 )   PT: 11.2 sec;   INR: 0.93 ratio         PTT - ( 15 Feb 2022 18:26 )  PTT:24.8 sec          BMI: BMI (kg/m2): 23.4 (02-15-22 @ 16:29)  HbA1c: A1C with Estimated Average Glucose Result: 5.9 % (02-16-22 @ 04:43)    Glucose:   BP: 177/69 (02-16-22 @ 07:45) (128/59 - 195/80)  Lipid Panel:         RADIOLOGY:    CARDIAC TESTING/STUDIES:    Telemetry: 	    ECG:    Echocardiogram:  Stress Test:  	  Catheterization:  	  ASSESSMENT/PLAN: 	  81y Female patient with past medical history of *** presenting with ***.        Theo Lopez MD, [facility]  Department of Cardiology 02-16-22 @ 09:58  [facility]-[id number]    CHIEF COMPLAINT:  Ms. Isaura Ma is a very pleasant 81 year old woman with a history of hypertension and a previous PCI to the LCX. She was referred to the [facility] emergency department over concern for an arterial thrombus in the chest which has already been ruled-out via CT-scan.. An incidental finding of a mid-LAD plaque was noted in this non-coronary gated CT study. An echocardiogram was performed this morning, which I personally reviewed; it is a normal study.     Ms. Ma does not experience any chest pain/pressure. She lives in a home which has stairs; she ascends these regularly with no difficulty.   There is no history of cigarette smoking or diabetes.     Allergies: No Known Allergies      PAST MEDICAL & SURGICAL HISTORY:  Hypertension  Hypothyroid  HLD (hyperlipidemia)  CAD (as above_    No significant past surgical history        MEDICATIONS  (STANDING):  escitalopram 5 milliGRAM(s) Oral daily  levothyroxine 50 MICROGram(s) Oral daily  simvastatin (dose?)  aspirin  atenolol      REVIEW OF SYSTEMS:    RESPIRATORY:  No Shortness of Breath  CARDIOVASCULAR: No chest pain, palpitations, syncope, edema      PHYSICAL EXAM:  Vital Signs Last 24 Hrs  T(C): 36.8 (16 Feb 2022 07:45), Max: 36.9 (16 Feb 2022 01:53)  T(F): 98.2 (16 Feb 2022 07:45), Max: 98.5 (16 Feb 2022 01:53)  HR: 64 (16 Feb 2022 07:45) (59 - 72)  BP: 177/69 (16 Feb 2022 07:45) (128/59 - 195/80)  BP(mean): --  RR: 18 (16 Feb 2022 07:45) (16 - 18)  SpO2: 97% (16 Feb 2022 07:45) (97% - 100%)        Daily Height in cm: 156.21 (15 Feb 2022 16:29)      Appearance: Normal	  HEENT: NCAT  Cardiovascular: Rhythm regular. Normal S1 S2, No JVD, No murmurs.  Respiratory: Lungs clear to auscultation	  Psychiatry: A & O x 3, Mood & affect appropriate  Gastrointestinal:  Soft, Non-tender,   Neurologic: Non-focal  Extremities: No edema  Vascular: Peripheral pulses palpable 2+ bilaterally    LABS:	 	                        11.8   7.48  )-----------( 195      ( 15 Feb 2022 18:26 )             37.3       02-15    139  |  103  |  18  ----------------------------<  92  3.9   |  24  |  0.84    Ca    9.1      15 Feb 2022 18:26    TPro  7.6  /  Alb  4.0  /  TBili  0.4  /  DBili  x   /  AST  21  /  ALT  13  /  AlkPhos  65  02-15  PT/INR - ( 15 Feb 2022 18:26 )   PT: 11.2 sec;   INR: 0.93 ratio    PTT - ( 15 Feb 2022 18:26 )  PTT:24.8 sec    BMI: BMI (kg/m2): 23.4 (02-15-22 @ 16:29)  HbA1c: A1C with Estimated Average Glucose Result: 5.9 % (02-16-22 @ 04:43)    Glucose:   BP: 177/69 (02-16-22 @ 07:45) (128/59 - 195/80)        RADIOLOGY::  	    ECG: Normal.  Echocardiogram: Normal.     	  ASSESSMENT/PLAN: 	  81y Female patient with past medical history of  known CAD found to have plaque in the mid-LAD on a non-cardiac dedicated contrast CT scan (reportedly, this has been known from previous scans), of which she is asymptomatic.     Recommendation:  Control hypertension  Given known CAD, should be on a high-potency statin by guidelines (e.g. atorvastatin or rosuvastatin)  COMPASS trial also suggests better outcome with aspirin 81 mg/d + rivaroxaban 2.5. mg bid.   Could have an outpatient exercise echo stress to ensure that this mid-LAD stenosis is not causing any problems (note resting echo demonstrates very robust sysyolic function).        Theo Lopez MD, [facility]  Department of Cardiology 02-16-22 @ 09:58  [facility]-[id number]    CHIEF COMPLAINT:  Ms. Isaura Ma is a very pleasant 81 year old woman with a history of hypertension and hyperlipidemia.  She was referred to the [facility] emergency department over concern for an arterial thrombus in the chest which has already been ruled-out via CT-scan. An incidental finding of a mid-LAD plaque was noted in this non-coronary gated CT study. An echocardiogram was performed this morning, which I personally reviewed; it is a normal study.     Ms. Ma does not experience any chest pain/pressure. She lives in a home which has stairs; she ascends these regularly with no difficulty. She is "always walking": on any given day, in addition to doing her housework (which involves going up stairs regularly), she is either walking 1 1/2 miles in a park near her home, walking in a shopping mall, or exercising on a treadmill. None of these activities produce any symptoms.     There is no history of cigarette smoking or diabetes.     Allergies: No Known Allergies      PAST MEDICAL & SURGICAL HISTORY:  Hypertension  Hypothyroid  HLD (hyperlipidemia)  CAD (as above_    No significant past surgical history        MEDICATIONS  (STANDING):  escitalopram 5 milliGRAM(s) Oral daily  levothyroxine 50 MICROGram(s) Oral daily  simvastatin (dose?)  aspirin  atenolol      REVIEW OF SYSTEMS:    RESPIRATORY:  No Shortness of Breath  CARDIOVASCULAR: No chest pain, palpitations, syncope, edema      PHYSICAL EXAM:  Vital Signs Last 24 Hrs  T(C): 36.8 (16 Feb 2022 07:45), Max: 36.9 (16 Feb 2022 01:53)  T(F): 98.2 (16 Feb 2022 07:45), Max: 98.5 (16 Feb 2022 01:53)  HR: 64 (16 Feb 2022 07:45) (59 - 72)  BP: 177/69 (16 Feb 2022 07:45) (128/59 - 195/80)  BP(mean): --  RR: 18 (16 Feb 2022 07:45) (16 - 18)  SpO2: 97% (16 Feb 2022 07:45) (97% - 100%)        Daily Height in cm: 156.21 (15 Feb 2022 16:29)      Appearance: Normal	  HEENT: NCAT  Cardiovascular: Rhythm regular. Normal S1 S2, No JVD, No murmurs.  Respiratory: Lungs clear to auscultation	  Psychiatry: A & O x 3, Mood & affect appropriate  Gastrointestinal:  Soft, Non-tender,   Neurologic: Non-focal  Extremities: No edema  Vascular: Peripheral pulses palpable 2+ bilaterally    LABS:	 	                        11.8   7.48  )-----------( 195      ( 15 Feb 2022 18:26 )             37.3       02-15    139  |  103  |  18  ----------------------------<  92  3.9   |  24  |  0.84    Ca    9.1      15 Feb 2022 18:26    TPro  7.6  /  Alb  4.0  /  TBili  0.4  /  DBili  x   /  AST  21  /  ALT  13  /  AlkPhos  65  02-15  PT/INR - ( 15 Feb 2022 18:26 )   PT: 11.2 sec;   INR: 0.93 ratio    PTT - ( 15 Feb 2022 18:26 )  PTT:24.8 sec    BMI: BMI (kg/m2): 23.4 (02-15-22 @ 16:29)  HbA1c: A1C with Estimated Average Glucose Result: 5.9 % (02-16-22 @ 04:43)    Glucose:   BP: 177/69 (02-16-22 @ 07:45) (128/59 - 195/80)    RADIOLOGY::  	    ECG: Normal.  Echocardiogram: Normal.     	  ASSESSMENT/PLAN: 	  81y Female patient with past medical history of  known CAD found to have plaque in the mid-LAD on a non-cardiac dedicated contrast CT scan (reportedly, this has been known from previous scans), of which she is asymptomatic.     Recommendation:  Control hypertension; would not be unreasonable to start a low dose of an agent (e.g. amlodipine 2.5 mg/d).  Given known CAD, should be on a high-potency statin by guidelines (e.g. atorvastatin or rosuvastatin)  Could have an outpatient exercise echo stress to ensure that this mid-LAD stenosis is not causing any problems (note resting echo demonstrates very robust systolic function).  If Ms. Ma would like, she can follow-up with me int he next two weeks.   From a cardiovascular perspective, she not need stay in the hospital.       Theo Lopez MD, [facility]  Department of Cardiology

## 2022-02-16 NOTE — ED CDU PROVIDER DISPOSITION NOTE - ATTENDING CONTRIBUTION TO CARE
Patient seen and evaluated c CDU PA.  Labs/imaging reviewed.  Vasc c/s note appreciated.  Agree c cards eval for LAD plaque.  Patient does not endorse ischemic symptoms -- no exercise intolerance, CP, sob, orthopnea recently.  Had reported normal stress 2 yr ago.  Pt is unsure who her cardiologist is.  Just returned from her echo and awaiting results from that.  Would prefer cards c/s re utility of provocative testing for acute coronary syndrome/CAD given no ischemic symptoms recently.  Will c/t monitor, f/u echo results and cards c/s.  --BMM Patient seen and evaluated c CDU PA.  Labs/imaging reviewed.  Vasc c/s note appreciated.  Agree c cards eval for LAD plaque.  Patient does not endorse ischemic symptoms -- no exercise intolerance, CP, sob, orthopnea recently.  Had reported normal stress 2 yr ago.  Pt is unsure who her cardiologist is.  Cardiology overnight fellow was not able to see patient in ED, recommended CDU evaluation.  Patient had just returned from her echo upon my evaluation.  Will obtain cards c/s regarding utility of provocative testing for ACS given lack of recent ischemic symptoms.  Will c/t monitor, f/u echo results and cards c/s.  --BMM

## 2022-02-16 NOTE — ED CDU PROVIDER SUBSEQUENT DAY NOTE - PROGRESS NOTE DETAILS
CDU NOTE MARIJA Ford: pt resting, reports some lower back discomfort positional associates with stretcher being uncomfortable. otherwise pt reports she has been feeling fine, no issues, on cp, sob/dyspnea. able to do normal routine, walking etc without new change/issue. NAD VSS. no events on tele. pt had echo. awaiting stress test CDU NOTE MARIJA Ford: called Cards Dr. Lopez for consult. as per Dr. Lopez, no need for cardiac testing, will come see patient.  stress test cancelled. pt in echo CDU NOTE MARIJA Ford: as per Dr. Lopez, recommended starting Amlodipine 2.5mg and can f/up in his office. echo reviewed by Dr. Lopez, ok to send home  informed pt of amlodipine recommendation, pt explained that she has had various issues with anti-hypertensives in the past which different adverse reactions and doesn't want to start new medication. pt did miss her atenolol dose in anticipation for stress today so pt given atenolol now and instructed to diary her blood pressures and f/up outpt  as per Dr. Franklin, pt stable for d/c home

## 2022-02-16 NOTE — ED ADULT NURSE REASSESSMENT NOTE - NS ED NURSE REASSESS COMMENT FT1
Pt received from VIOLA Morgan. Pt oriented to CDU & plan of care was discussed. Pt denies any chest pain, SOB, dizziness or palpitations. Pt denies any symptoms and states she feels well. Safety & comfort measures maintained. Call bell in reach. Will continue to monitor.
Patient received this am A&Ox3 in [facility], [facility]. Patient denies any chest pain at this time. Patient transferred to CDU to r/o aortic thrombus, awaiting echo results and stress test at this time. Plan of care explained. Call bell within reach. Will continue to monitor, Safety maintained.

## 2022-02-16 NOTE — ED CDU PROVIDER DISPOSITION NOTE - NSFOLLOWUPINSTRUCTIONS_ED_ALL_ED_FT
1) Follow-up with your Primary Medical Doctor or referred doctor. Call today / next business day for prompt follow-up.  2) Return to Emergency room for any worsening or persistent pain, weakness, fever, or any other concerning symptoms.  3) See attached instruction sheets for additional information, including information regarding signs and symptoms to look out for, reasons to seek immediate care and other important instructions.  4) Follow-up with any specialists as discussed / noted as well. 1. Stay hydrated. follow low salt diet. eat healthy.  2. Continue Current Home Medications except stop simvastatin and start Atorvastatin 20mg daily. Your blood pressure is high here, please record your blood pressures at home and follow up with the Cardiologist. The cardiologist had recommended additional anti-hypertensive medication, please discuss with your doctors.   3. Follow up with your PCP in 1-2 days. Follow up with Cardiologist Dr. Lopez within 2 weeks, Dr. Lopez can send you for a stress-echo outpatient. (Bring printed results to your doctor visit).  4. Return if symptoms, worsen, fever, weakness, chest pain, difficulty breathing, dizziness and all other concerns.        Coronary Artery Disease, Female      Coronary artery disease (CAD) is a condition in which the arteries that lead to the heart (coronary arteries) become narrow or blocked. The narrowing or blockage can lead to decreased blood flow to the heart. Prolonged reduced blood flow can cause a heart attack (myocardial infarction or MI). This condition may also be called coronary heart disease.    Because CAD is the leading cause of death in women, it is important to understand what causes this condition and how it is treated.      What are the causes?     CAD is most often caused by atherosclerosis. This is the buildup of fat and cholesterol (plaque) on the inside of the arteries. Over time, the plaque may narrow or block the artery, reducing blood flow to the heart. Plaque can also become weak and break off within a coronary artery and cause a sudden blockage. Other less common causes of CAD include:  •A blood clot or a piece of a blood clot or other substance that blocks the flow of blood in a coronary artery (embolism).      •A tearing of the artery (spontaneous coronary artery dissection).      •An enlargement of an artery (aneurysm).      •Inflammation (vasculitis) in the artery wall.        What increases the risk?    The following factors may make you more likely to develop this condition:  •Age. Women over age 55 are at a greater risk of CAD.      •Family history of CAD.      •High blood pressure (hypertension).      •Diabetes.      •High cholesterol levels.      •Tobacco use.      •Lack of exercise.    •Menopause.  •All postmenopausal women are at greater risk of CAD.      •Women who have experienced menopause between the ages of 40–45 (early menopause) are at a higher risk of CAD.      •Women who have experienced menopause before age 40 (premature menopause) are at a very high risk of CAD.        •Excessive alcohol use.      •A diet high in saturated and trans fats, such as fried food and processed meat.      Other possible risk factors include:  •High stress levels.      •Depression.      •Obesity.      •Sleep apnea.        What are the signs or symptoms?    Many people do not have any symptoms during the early stages of CAD. As the condition progresses, symptoms may include:•Chest pain (angina). The pain can:  •Feel like crushing or squeezing, or like a tightness, pressure, fullness, or heaviness in the chest.      •Last more than a few minutes or can stop and recur. The pain tends to get worse with exercise or stress and to fade with rest.        •Pain in the arms, neck, jaw, ear, or back.      •Unexplained heartburn or indigestion.      •Shortness of breath.      •Nausea.      •Sudden cold sweats.      •Sudden light-headedness.      •Fluttering or fast heartbeat (palpitations).      Many women have chest discomfort and the other symptoms. However, women often have unusual (atypical) symptoms, such as:  •Fatigue.      •Vomiting.      •Unexplained feelings of nervousness or anxiety.      •Unexplained weakness.      •Dizziness or fainting.        How is this diagnosed?    This condition is diagnosed based on:  •Your family and medical history.      •A physical exam.    •Tests, including:  •A test to check the electrical signals in your heart (electrocardiogram).      •Exercise stress test. This looks for signs of blockage when the heart is stressed with exercise, such as running on a treadmill.      •Pharmacologic stress test. This test looks for signs of blockage when the heart is being stressed with a medicine.      •Blood tests.      •Coronary angiogram. This is a procedure to look at the coronary arteries to see if there is any blockage. During this test, a dye is injected into your arteries so they appear on an X-ray.      •Coronary artery CT scan. This CT scan helps detect calcium deposits in your coronary arteries. Calcium deposits are an indicator of CAD.      •A test that uses sound waves to take a picture of your heart (echocardiogram).      •Chest X-ray.          How is this treated?    This condition may be treated by:  •Healthy lifestyle changes to reduce risk factors.    •Medicines such as:  •Antiplatelet medicines and blood-thinning medicines, such as aspirin. These help to prevent blood clots.      •Nitroglycerin.      •Blood pressure medicines.      •Cholesterol-lowering medicine.        •Coronary angioplasty and stenting. During this procedure, a thin, flexible tube is inserted through a blood vessel and into a blocked artery. A balloon or similar device on the end of the tube is inflated to open up the artery. In some cases, a small, mesh tube (stent) is inserted into the artery to keep it open.      •Coronary artery bypass surgery. During this surgery, veins or arteries from other parts of the body are used to create a bypass around the blockage and allow blood to reach your heart.        Follow these instructions at home:    Medicines     •Take over-the-counter and prescription medicines only as told by your health care provider.    • Do not take the following medicines unless your health care provider approves:  •NSAIDs, such as ibuprofen, naproxen, or celecoxib.      •Vitamin supplements that contain vitamin A, vitamin E, or both.      •Hormone replacement therapy that contains estrogen with or without progestin.        Lifestyle     •Follow an exercise program approved by your health care provider. Aim for 150 minutes of moderate exercise or 75 minutes of vigorous exercise each week.      •Maintain a healthy weight or lose weight as approved by your health care provider.      •Learn to manage stress or try to limit your stress. Ask your health care provider for suggestions if you need help.      •Get screened for depression and seek treatment, if needed.      • Do not use any products that contain nicotine or tobacco, such as cigarettes, e-cigarettes, and chewing tobacco. If you need help quitting, ask your health care provider.      • Do not use illegal drugs.        Eating and drinking      •Follow a heart-healthy diet. A dietitian can help educate you about healthy food options and changes. In general, eat plenty of fruits and vegetables, lean meats, and whole grains.    •Avoid foods high in:  •Sugar.      •Salt (sodium).      •Saturated fats, such as processed or fatty meat.      •Trans fats, such as fried food.        •Use healthy cooking methods such as roasting, grilling, broiling, baking, poaching, steaming, or stir-frying.    • Do not drink alcohol if:  •Your health care provider tells you not to drink.      •You are pregnant, may be pregnant, or are planning to become pregnant.      •If you drink alcohol:  •Limit how much you have to 0–1 drink a day.      •Be aware of how much alcohol is in your drink. In the [place] one drink equals one 12 oz bottle of beer (355 mL), one 5 oz glass of wine (148 mL), or one 1½ oz glass of hard liquor (44 mL).        General instructions     •Manage any other health conditions, such as hypertension and diabetes. These conditions affect your heart.      •Your health care provider may ask you to monitor your blood pressure. Ideally, your blood pressure should be below 130/80.      •Keep all follow-up visits as told by your health care provider. This is important.        Get help right away if:  •You have pain in your chest, neck, ear, arm, jaw, stomach, or back that:  •Lasts more than a few minutes.      •Is recurring.      •Is not relieved by taking medicine under your tongue (sublingual nitroglycerin).        •You have profuse sweating without cause.    •You have unexplained:  •Heartburn or indigestion.      •Shortness of breath or difficulty breathing.      •Fluttering or fast heartbeat (palpitations).      •Nausea or vomiting.      •Fatigue.      •Feelings of nervousness or anxiety.      •Weakness.      •Diarrhea.        •You have sudden light-headedness or dizziness.      •You faint.      •You feel like hurting yourself or think about taking your own life.      These symptoms may represent a serious problem that is an emergency. Do not wait to see if the symptoms will go away. Get medical help right away. Call your local emergency services (911 in the [place] Do not drive yourself to the hospital.       Summary    •Coronary artery disease (CAD) is a condition in which the arteries that lead to the heart (coronary arteries) become narrow or blocked. The narrowing or blockage can lead to a heart attack.      •Many women have chest discomfort and other common symptoms of CAD. However, women often have unusual (atypical) symptoms, such as fatigue, vomiting, weakness, or dizziness.      •CAD can be treated with lifestyle changes, medicines, surgery, or a combination of these treatments.      This information is not intended to replace advice given to you by your health care provider. Make sure you discuss any questions you have with your health care provider.      Document Revised: 09/06/2019 Document Reviewed: 08/27/2019    [employer] Patient Education © 2021 [employer] 1. Stay hydrated. follow low salt diet. eat healthy.  2. Continue Current Home Medications except stop simvastatin and start Atorvastatin 20mg daily. Your blood pressure is high here, please record your blood pressures at home and follow up with the Cardiologist. The cardiologist had recommended additional anti-hypertensive medication, please discuss with your doctors.   3. Follow up with your PCP in 1-2 days. Follow up with Cardiologist Dr. Lopez within 2 weeks, Dr. Lopez can send you for a stress-echo outpatient. (Bring printed results to your doctor visit).  4. Return if symptoms, worsen, fever, weakness, chest pain, difficulty breathing, dizziness and all other concerns.  ***follow up the following with your Doctor:  a1c 5.9 (prediabetic range)- follow low carb/sugar diet   incidental findings found on imaging:  Degenerative changes. Status post vertebroplasty of the T9-T10 and   T10-T11 intervertebral disc spaces.  Calcifications of   the aorta.  Mixed plaque within the LAD causes at least moderate and   possibly severe stenosis.        Coronary Artery Disease, Female      Coronary artery disease (CAD) is a condition in which the arteries that lead to the heart (coronary arteries) become narrow or blocked. The narrowing or blockage can lead to decreased blood flow to the heart. Prolonged reduced blood flow can cause a heart attack (myocardial infarction or MI). This condition may also be called coronary heart disease.    Because CAD is the leading cause of death in women, it is important to understand what causes this condition and how it is treated.      What are the causes?     CAD is most often caused by atherosclerosis. This is the buildup of fat and cholesterol (plaque) on the inside of the arteries. Over time, the plaque may narrow or block the artery, reducing blood flow to the heart. Plaque can also become weak and break off within a coronary artery and cause a sudden blockage. Other less common causes of CAD include:  •A blood clot or a piece of a blood clot or other substance that blocks the flow of blood in a coronary artery (embolism).      •A tearing of the artery (spontaneous coronary artery dissection).      •An enlargement of an artery (aneurysm).      •Inflammation (vasculitis) in the artery wall.        What increases the risk?    The following factors may make you more likely to develop this condition:  •Age. Women over age 55 are at a greater risk of CAD.      •Family history of CAD.      •High blood pressure (hypertension).      •Diabetes.      •High cholesterol levels.      •Tobacco use.      •Lack of exercise.    •Menopause.  •All postmenopausal women are at greater risk of CAD.      •Women who have experienced menopause between the ages of 40–45 (early menopause) are at a higher risk of CAD.      •Women who have experienced menopause before age 40 (premature menopause) are at a very high risk of CAD.        •Excessive alcohol use.      •A diet high in saturated and trans fats, such as fried food and processed meat.      Other possible risk factors include:  •High stress levels.      •Depression.      •Obesity.      •Sleep apnea.        What are the signs or symptoms?    Many people do not have any symptoms during the early stages of CAD. As the condition progresses, symptoms may include:•Chest pain (angina). The pain can:  •Feel like crushing or squeezing, or like a tightness, pressure, fullness, or heaviness in the chest.      •Last more than a few minutes or can stop and recur. The pain tends to get worse with exercise or stress and to fade with rest.        •Pain in the arms, neck, jaw, ear, or back.      •Unexplained heartburn or indigestion.      •Shortness of breath.      •Nausea.      •Sudden cold sweats.      •Sudden light-headedness.      •Fluttering or fast heartbeat (palpitations).      Many women have chest discomfort and the other symptoms. However, women often have unusual (atypical) symptoms, such as:  •Fatigue.      •Vomiting.      •Unexplained feelings of nervousness or anxiety.      •Unexplained weakness.      •Dizziness or fainting.        How is this diagnosed?    This condition is diagnosed based on:  •Your family and medical history.      •A physical exam.    •Tests, including:  •A test to check the electrical signals in your heart (electrocardiogram).      •Exercise stress test. This looks for signs of blockage when the heart is stressed with exercise, such as running on a treadmill.      •Pharmacologic stress test. This test looks for signs of blockage when the heart is being stressed with a medicine.      •Blood tests.      •Coronary angiogram. This is a procedure to look at the coronary arteries to see if there is any blockage. During this test, a dye is injected into your arteries so they appear on an X-ray.      •Coronary artery CT scan. This CT scan helps detect calcium deposits in your coronary arteries. Calcium deposits are an indicator of CAD.      •A test that uses sound waves to take a picture of your heart (echocardiogram).      •Chest X-ray.          How is this treated?    This condition may be treated by:  •Healthy lifestyle changes to reduce risk factors.    •Medicines such as:  •Antiplatelet medicines and blood-thinning medicines, such as aspirin. These help to prevent blood clots.      •Nitroglycerin.      •Blood pressure medicines.      •Cholesterol-lowering medicine.        •Coronary angioplasty and stenting. During this procedure, a thin, flexible tube is inserted through a blood vessel and into a blocked artery. A balloon or similar device on the end of the tube is inflated to open up the artery. In some cases, a small, mesh tube (stent) is inserted into the artery to keep it open.      •Coronary artery bypass surgery. During this surgery, veins or arteries from other parts of the body are used to create a bypass around the blockage and allow blood to reach your heart.        Follow these instructions at home:    Medicines     •Take over-the-counter and prescription medicines only as told by your health care provider.    • Do not take the following medicines unless your health care provider approves:  •NSAIDs, such as ibuprofen, naproxen, or celecoxib.      •Vitamin supplements that contain vitamin A, vitamin E, or both.      •Hormone replacement therapy that contains estrogen with or without progestin.        Lifestyle     •Follow an exercise program approved by your health care provider. Aim for 150 minutes of moderate exercise or 75 minutes of vigorous exercise each week.      •Maintain a healthy weight or lose weight as approved by your health care provider.      •Learn to manage stress or try to limit your stress. Ask your health care provider for suggestions if you need help.      •Get screened for depression and seek treatment, if needed.      • Do not use any products that contain nicotine or tobacco, such as cigarettes, e-cigarettes, and chewing tobacco. If you need help quitting, ask your health care provider.      • Do not use illegal drugs.        Eating and drinking      •Follow a heart-healthy diet. A dietitian can help educate you about healthy food options and changes. In general, eat plenty of fruits and vegetables, lean meats, and whole grains.    •Avoid foods high in:  •Sugar.      •Salt (sodium).      •Saturated fats, such as processed or fatty meat.      •Trans fats, such as fried food.        •Use healthy cooking methods such as roasting, grilling, broiling, baking, poaching, steaming, or stir-frying.    • Do not drink alcohol if:  •Your health care provider tells you not to drink.      •You are pregnant, may be pregnant, or are planning to become pregnant.      •If you drink alcohol:  •Limit how much you have to 0–1 drink a day.      •Be aware of how much alcohol is in your drink. In the [place] one drink equals one 12 oz bottle of beer (355 mL), one 5 oz glass of wine (148 mL), or one 1½ oz glass of hard liquor (44 mL).        General instructions     •Manage any other health conditions, such as hypertension and diabetes. These conditions affect your heart.      •Your health care provider may ask you to monitor your blood pressure. Ideally, your blood pressure should be below 130/80.      •Keep all follow-up visits as told by your health care provider. This is important.        Get help right away if:  •You have pain in your chest, neck, ear, arm, jaw, stomach, or back that:  •Lasts more than a few minutes.      •Is recurring.      •Is not relieved by taking medicine under your tongue (sublingual nitroglycerin).        •You have profuse sweating without cause.    •You have unexplained:  •Heartburn or indigestion.      •Shortness of breath or difficulty breathing.      •Fluttering or fast heartbeat (palpitations).      •Nausea or vomiting.      •Fatigue.      •Feelings of nervousness or anxiety.      •Weakness.      •Diarrhea.        •You have sudden light-headedness or dizziness.      •You faint.      •You feel like hurting yourself or think about taking your own life.      These symptoms may represent a serious problem that is an emergency. Do not wait to see if the symptoms will go away. Get medical help right away. Call your local emergency services (911 in the [place] Do not drive yourself to the hospital.       Summary    •Coronary artery disease (CAD) is a condition in which the arteries that lead to the heart (coronary arteries) become narrow or blocked. The narrowing or blockage can lead to a heart attack.      •Many women have chest discomfort and other common symptoms of CAD. However, women often have unusual (atypical) symptoms, such as fatigue, vomiting, weakness, or dizziness.      •CAD can be treated with lifestyle changes, medicines, surgery, or a combination of these treatments.      This information is not intended to replace advice given to you by your health care provider. Make sure you discuss any questions you have with your health care provider.      Document Revised: 09/06/2019 Document Reviewed: 08/27/2019    [employer] Patient Education © 2021 [employer]

## 2022-02-16 NOTE — ED ADULT NURSE REASSESSMENT NOTE - ANCILLARY STATUS
stress test, echo results/awaiting radiology
patient seen by vascular team. awaiting CT scan of abdomen/pelvis. patient remains on cardiac monitor./awaiting radiology

## 2022-02-16 NOTE — ED CDU PROVIDER DISPOSITION NOTE - PATIENT PORTAL LINK FT
You can access the FollowMyHealth Patient Portal offered by [facility] by registering at the following website: [email or web address]. By joining [employer]’s FollowMyHealth portal, you will also be able to view your health information using other applications (apps) compatible with our system.

## 2022-02-18 NOTE — ADDENDUM
[FreeTextEntry1] : Reviewed patient's carotid duplex performed 9/17/2021.\par < 50% stenosis bilateral ICAs. \par Patient being followed by PCP. \par \par Reviewed CTA performed in ED. There is no aortic thrombus noted. There was significant coronary artery stenosis, for which she was seen by cardiology and recommended outpatient follow up.

## 2022-02-18 NOTE — HISTORY OF PRESENT ILLNESS
[FreeTextEntry1] : CHITRA ASIF is a 81 year old female who presents for evaluation of AAA. \par \par Referred by Dr. Tatiana Mckoy. \par \par Patient states that she went for her yearly checkup with her PCP because her mother and sister had abdominal aortic aneurysm. Sister passed away from ruptured aneurysm. Due to strong family history, she has been under surveillance. During the evaluation in August, she underwent an ultrasound. The patient was subsequently told that she should go to the emergency room, but the patient did not receive the notification until recently. At this time, the referral was made for her to see a vascular surgeon. \par \par Upon review of ultrasound, there was a question of a thrombus in the distal aorta. When patient spoke with Dr. Mckoy again upon receiving notification, the decision was made to go to vascular surgeon instead of the emergency room.\par \par Patient has a chronic history of intermittent lower extremity pain, though to be secondary to arthritis. No history of claudication. \par \par + PMH: Hypertension, hypothyroidism, depression, uterine cancer s/p hysterectomy, COPD\par + PSH: appendectomy, s/p hysterectomy\par + FH: AAA (mother, sister)\par + SH: former smoker, 1-2 drinks/day\par + Aller: NKDA\par \par PCP is Dr. Tatiana Mckoy. \par

## 2022-02-18 NOTE — PHYSICAL EXAM
[Calm] : calm [1+] : left 1+ [2+] : left 2+ [de-identified] : Well-appearing  [de-identified] : soft, nt, nd  [de-identified] : motor and sensation intact in all four extremities  [de-identified] : A&Ox4

## 2022-02-18 NOTE — DATA REVIEWED
[FreeTextEntry1] : 2/15/2022-Aortoiliac duplex\par Possible acute thrombus vs. artifact at mid-distal aorta.\par no evidence of aneurysm. \par --------------------------\par Reviewed ultrasound performed at 8/30/2021. This did not show any AAA or CIAA but was significant for questionable partially obstructing thrombus in the distal aorta.

## 2022-02-18 NOTE — ASSESSMENT
[FreeTextEntry1] : CHITRA ASIF is a 81 year old female presents for evaluation.\par \par - Reviewed results of duplex with patient and daughter. There is a persistent possible thrombus vs. artifact in the mid-distal aorta. \par - Will sent patient to ED for emergent CTA c/a/p and possible anticoagulation pending imaging findings.

## 2022-02-21 PROBLEM — E03.9 HYPOTHYROIDISM, UNSPECIFIED: Chronic | Status: ACTIVE | Noted: 2022-02-16

## 2022-02-21 PROBLEM — I10 ESSENTIAL (PRIMARY) HYPERTENSION: Chronic | Status: ACTIVE | Noted: 2022-02-16

## 2022-02-21 PROBLEM — E78.5 HYPERLIPIDEMIA, UNSPECIFIED: Chronic | Status: ACTIVE | Noted: 2022-02-16

## 2022-03-15 ENCOUNTER — NON-APPOINTMENT (OUTPATIENT)
Age: 82
End: 2022-03-15

## 2022-03-15 ENCOUNTER — APPOINTMENT (OUTPATIENT)
Dept: CARDIOLOGY | Facility: CLINIC | Age: 82
End: 2022-03-15
Payer: MEDICARE

## 2022-03-15 VITALS — DIASTOLIC BLOOD PRESSURE: 70 MMHG | SYSTOLIC BLOOD PRESSURE: 160 MMHG

## 2022-03-15 VITALS
HEART RATE: 57 BPM | HEIGHT: 62 IN | SYSTOLIC BLOOD PRESSURE: 155 MMHG | WEIGHT: 126 LBS | DIASTOLIC BLOOD PRESSURE: 76 MMHG | OXYGEN SATURATION: 98 % | BODY MASS INDEX: 23.19 KG/M2

## 2022-03-15 DIAGNOSIS — I10 ESSENTIAL (PRIMARY) HYPERTENSION: ICD-10-CM

## 2022-03-15 PROCEDURE — 93000 ELECTROCARDIOGRAM COMPLETE: CPT

## 2022-03-15 PROCEDURE — 99212 OFFICE O/P EST SF 10 MIN: CPT

## 2022-03-15 RX ORDER — ATORVASTATIN CALCIUM 20 MG/1
20 TABLET, FILM COATED ORAL
Qty: 90 | Refills: 0 | Status: ACTIVE | COMMUNITY
Start: 2022-02-25

## 2022-03-15 RX ORDER — ESCITALOPRAM OXALATE 5 MG/1
5 TABLET ORAL DAILY
Refills: 0 | Status: ACTIVE | COMMUNITY

## 2022-03-15 RX ORDER — ASPIRIN 81 MG/1
81 TABLET ORAL
Refills: 0 | Status: DISCONTINUED | COMMUNITY
End: 2022-03-15

## 2022-03-15 NOTE — REASON FOR VISIT
[FreeTextEntry1] : Follow-up; patient seen by me in the emergency department last month (Feb. 16, 2022).

## 2022-03-15 NOTE — PHYSICAL EXAM
[Normal Conjunctiva] : normal conjunctiva [Soft] : abdomen soft [Normal] : normal gait [No Edema] : no edema [Moves all extremities] : moves all extremities

## 2022-03-15 NOTE — DISCUSSION/SUMMARY
[FreeTextEntry1] : Recommend:\par Start amlodipine 2.5 mg/d; further recommendations to be based on ambulatory BP readings\par No indication for daily aspirin which patient had started on her own, not because of any physician's advice)\par Continue atorvastatin

## 2022-03-15 NOTE — HISTORY OF PRESENT ILLNESS
[FreeTextEntry1] : From my CDU Note, 2/16/2022:\par \par "Ms. Isaura Ma is a very pleasant 81 year old woman with a history of\par hypertension and hyperlipidemia. She was referred to the [facility] emergency\par department over concern for an arterial thrombus in the chest which has already\par been ruled-out via CT-scan. An incidental finding of a mid-LAD plaque was noted\par in this non-coronary gated CT study. An echocardiogram was performed this\par morning, which I personally reviewed; it is a normal study.\par \par Ms. Ma does not experience any chest pain/pressure. She lives in a home\par which has stairs; she ascends these regularly with no difficulty. She is\par "always walking": on any given day, in addition to doing her housework (which\par involves going up stairs regularly), she is either walking 1 1/2 miles in a\par park near her home, walking in a shopping mall, or exercising on a treadmill.\par None of these activities produce any symptoms."\par \par My assessment at the time was as follows:\par \par "Control hypertension; would not be unreasonable to start a low dose of an agent\par (e.g. amlodipine 2.5 mg/d).\par Given known CAD, should be on a high-potency statin by guidelines (e.g.\par atorvastatin or rosuvastatin)\par Could have an outpatient exercise echo stress to ensure that this mid-LAD\par stenosis is not causing any problems (note resting echo demonstrates very\par robust systolic function).\par If Ms. Ma would like, she can follow-up with me in the next two weeks.\par From a cardiovascular perspective, she not need stay in the hospital."\par \par Since that time:\par Has never had any symptoms of coronary artery disease\par Never started amlodipine; states BP readings at home  are high, but cannot supply any readings here today. \par Continues to have level of activity as described above.

## 2022-07-18 ENCOUNTER — APPOINTMENT (OUTPATIENT)
Dept: UROLOGY | Facility: CLINIC | Age: 82
End: 2022-07-18

## 2022-07-18 VITALS
RESPIRATION RATE: 16 BRPM | TEMPERATURE: 207.68 F | SYSTOLIC BLOOD PRESSURE: 157 MMHG | HEART RATE: 63 BPM | HEIGHT: 61.5 IN | DIASTOLIC BLOOD PRESSURE: 82 MMHG | WEIGHT: 126 LBS | BODY MASS INDEX: 23.48 KG/M2

## 2022-07-18 DIAGNOSIS — R31.21 ASYMPTOMATIC MICROSCOPIC HEMATURIA: ICD-10-CM

## 2022-07-18 DIAGNOSIS — Z87.891 PERSONAL HISTORY OF NICOTINE DEPENDENCE: ICD-10-CM

## 2022-07-18 PROCEDURE — 99214 OFFICE O/P EST MOD 30 MIN: CPT

## 2022-07-18 RX ORDER — TIZANIDINE 2 MG/1
2 TABLET ORAL
Qty: 30 | Refills: 0 | Status: COMPLETED | COMMUNITY
Start: 2022-03-13 | End: 2022-07-18

## 2022-07-18 RX ORDER — NITROFURANTOIN (MONOHYDRATE/MACROCRYSTALS) 25; 75 MG/1; MG/1
100 CAPSULE ORAL
Qty: 14 | Refills: 0 | Status: DISCONTINUED | COMMUNITY
Start: 2022-07-11

## 2022-07-18 RX ORDER — SIMVASTATIN 20 MG/1
20 TABLET, FILM COATED ORAL
Qty: 90 | Refills: 0 | Status: DISCONTINUED | COMMUNITY
Start: 2021-08-03

## 2022-07-18 RX ORDER — DONEPEZIL HYDROCHLORIDE 5 MG/1
5 TABLET ORAL
Qty: 90 | Refills: 0 | Status: COMPLETED | COMMUNITY
Start: 2022-03-01 | End: 2022-07-18

## 2022-07-18 RX ORDER — SILVER SULFADIAZINE 10 MG/G
1 CREAM TOPICAL
Qty: 85 | Refills: 0 | Status: DISCONTINUED | COMMUNITY
Start: 2022-02-11

## 2022-07-18 RX ORDER — ESTRADIOL 0.1 MG/G
0.1 CREAM VAGINAL
Qty: 1 | Refills: 5 | Status: ACTIVE | COMMUNITY
Start: 2022-07-18 | End: 1900-01-01

## 2022-07-19 LAB
APPEARANCE: CLEAR
BACTERIA UR CULT: NORMAL
BACTERIA: NEGATIVE
BILIRUBIN URINE: NEGATIVE
BLOOD URINE: NEGATIVE
COLOR: YELLOW
GLUCOSE QUALITATIVE U: NEGATIVE
HYALINE CASTS: 0 /LPF
KETONES URINE: NORMAL
LEUKOCYTE ESTERASE URINE: ABNORMAL
MICROSCOPIC-UA: NORMAL
NITRITE URINE: NEGATIVE
PH URINE: 6
PROTEIN URINE: NEGATIVE
RED BLOOD CELLS URINE: 3 /HPF
SPECIFIC GRAVITY URINE: 1.02
SQUAMOUS EPITHELIAL CELLS: 1 /HPF
UROBILINOGEN URINE: NORMAL
WHITE BLOOD CELLS URINE: 12 /HPF

## 2022-07-19 NOTE — PHYSICAL EXAM
[General Appearance - Well Developed] : well developed [Normal Appearance] : normal appearance [General Appearance - Well Nourished] : well nourished [Well Groomed] : well groomed [General Appearance - In No Acute Distress] : no acute distress [Abdomen Soft] : soft [Abdomen Tenderness] : non-tender [Costovertebral Angle Tenderness] : no ~M costovertebral angle tenderness [Urinary Bladder Findings] : the bladder was normal on palpation [] : no respiratory distress [Edema] : no peripheral edema [Respiration, Rhythm And Depth] : normal respiratory rhythm and effort [Exaggerated Use Of Accessory Muscles For Inspiration] : no accessory muscle use [Affect] : the affect was normal [Oriented To Time, Place, And Person] : oriented to person, place, and time [Mood] : the mood was normal [Not Anxious] : not anxious [Normal Station and Gait] : the gait and station were normal for the patient's age [No Focal Deficits] : no focal deficits [No Palpable Adenopathy] : no palpable adenopathy

## 2022-07-19 NOTE — LETTER BODY
[FreeTextEntry1] : Tatiana Mckoy, DO\par [address]\[place] [address]\par \par Dear Dr. Mckoy,\par \par Isaura Ma returns to the office today.  She is an 82-year-old woman who is back today in follow-up.  I had seen her in September of last year.  She had come to see me regarding microscopic hematuria with 6 white blood cells per high-power field on urinalysis.  She has also had several urinalyses negative for blood.  We had discussed risks and benefits associated with the microscopic hematuria and potential work-up.  I advised her that based on the very minor degree of her hematuria that I did not feel that cystoscopy was mandatory and that we could pursue it if the hematuria became more significant either microscopically or if it became gross hematuria.  She has no major environmental risk factors.  She did smoke briefly in her early 20s but has not smoked since around the age of 26.  She has no other known environmental exposures.  She is asymptomatic from a urinary standpoint at this time including any urgency or frequency.  There has been no gross hematuria.  She denies incontinence.  She does have periodic urinary tract infections and would like to seek out potential treatment or prevention options.  She thinks she has experienced about 3 infections per year.  She is currently finishing a course of antibiotics.  Her symptoms with infection have included increased frequency and dysuria.\par \par We discussed multimodal treatment and prevention of urinary tract infections.  This included hydration, taking measures to avoid constipation, cranberry supplementation, vaginal probiotic suppositories, and topical estrogen cream.  I have given her information and provided her with some samples of the suppositories and cranberry supplements.  I have also provided her with a prescription for topical estrogen cream.  I am hopeful these measures will help reduce the risk of her developing urinary tract infections moving forward.  Proper use of all of the above were reviewed including potential side effects.\par \par For now I would still recommend that she not need cystoscopy.  I will reassess the urine today to determine if there is any more significant hematuria present.  If this were the case, I may pursue it but otherwise we will observe this for now.  Please do not hesitate to contact me with any questions or concerns.\par \par Sincerely,\par \par \par \par \par Willian Sanchez MD, FACS\par Chief of Urology, [facility]\par  of Urology\par Director of Robotic and Laparoscopic Surgery\par [facility]\par \par [facility]\par [address] [place] [address]\[place] [address]\par P: [phone]\par F: [phone]\par [facility]\par

## 2022-09-15 ENCOUNTER — APPOINTMENT (OUTPATIENT)
Dept: UROLOGY | Facility: CLINIC | Age: 82
End: 2022-09-15

## 2022-11-23 ENCOUNTER — EMERGENCY (EMERGENCY)
Facility: HOSPITAL | Age: 82
LOS: 0 days | Discharge: HOME | End: 2022-11-23
Attending: STUDENT IN AN ORGANIZED HEALTH CARE EDUCATION/TRAINING PROGRAM

## 2022-11-23 VITALS
RESPIRATION RATE: 19 BRPM | DIASTOLIC BLOOD PRESSURE: 72 MMHG | SYSTOLIC BLOOD PRESSURE: 102 MMHG | TEMPERATURE: 98 F | HEIGHT: 61 IN | OXYGEN SATURATION: 99 % | HEART RATE: 69 BPM | WEIGHT: 126.1 LBS

## 2022-11-23 VITALS — RESPIRATION RATE: 19 BRPM | OXYGEN SATURATION: 98 %

## 2022-11-23 DIAGNOSIS — E03.9 HYPOTHYROIDISM, UNSPECIFIED: ICD-10-CM

## 2022-11-23 DIAGNOSIS — R05.9 COUGH, UNSPECIFIED: ICD-10-CM

## 2022-11-23 DIAGNOSIS — I10 ESSENTIAL (PRIMARY) HYPERTENSION: ICD-10-CM

## 2022-11-23 DIAGNOSIS — J44.1 CHRONIC OBSTRUCTIVE PULMONARY DISEASE WITH (ACUTE) EXACERBATION: ICD-10-CM

## 2022-11-23 DIAGNOSIS — Z20.822 CONTACT WITH AND (SUSPECTED) EXPOSURE TO COVID-19: ICD-10-CM

## 2022-11-23 DIAGNOSIS — Z87.891 PERSONAL HISTORY OF NICOTINE DEPENDENCE: ICD-10-CM

## 2022-11-23 DIAGNOSIS — R07.9 CHEST PAIN, UNSPECIFIED: ICD-10-CM

## 2022-11-23 DIAGNOSIS — Z85.42 PERSONAL HISTORY OF MALIGNANT NEOPLASM OF OTHER PARTS OF UTERUS: ICD-10-CM

## 2022-11-23 DIAGNOSIS — R06.02 SHORTNESS OF BREATH: ICD-10-CM

## 2022-11-23 DIAGNOSIS — E78.5 HYPERLIPIDEMIA, UNSPECIFIED: ICD-10-CM

## 2022-11-23 DIAGNOSIS — J06.9 ACUTE UPPER RESPIRATORY INFECTION, UNSPECIFIED: ICD-10-CM

## 2022-11-23 LAB
ALBUMIN SERPL ELPH-MCNC: 3.2 G/DL — LOW (ref 3.3–5)
ALP SERPL-CCNC: 59 U/L — SIGNIFICANT CHANGE UP (ref 40–120)
ALT FLD-CCNC: 21 U/L — SIGNIFICANT CHANGE UP (ref 12–78)
ANION GAP SERPL CALC-SCNC: 10 MMOL/L — SIGNIFICANT CHANGE UP (ref 5–17)
AST SERPL-CCNC: 38 U/L — HIGH (ref 15–37)
BASE EXCESS BLDV CALC-SCNC: 2.9 MMOL/L — SIGNIFICANT CHANGE UP (ref -2–3)
BASOPHILS # BLD AUTO: 0.04 K/UL — SIGNIFICANT CHANGE UP (ref 0–0.2)
BASOPHILS NFR BLD AUTO: 0.3 % — SIGNIFICANT CHANGE UP (ref 0–2)
BILIRUB SERPL-MCNC: 0.7 MG/DL — SIGNIFICANT CHANGE UP (ref 0.2–1.2)
BLOOD GAS COMMENTS, VENOUS: SIGNIFICANT CHANGE UP
BUN SERPL-MCNC: 17 MG/DL — SIGNIFICANT CHANGE UP (ref 7–23)
CALCIUM SERPL-MCNC: 8.6 MG/DL — SIGNIFICANT CHANGE UP (ref 8.5–10.1)
CHLORIDE BLDV-SCNC: 99 MMOL/L — SIGNIFICANT CHANGE UP (ref 98–107)
CHLORIDE SERPL-SCNC: 100 MMOL/L — SIGNIFICANT CHANGE UP (ref 96–108)
CO2 BLDV-SCNC: 30 MMOL/L — HIGH (ref 22–26)
CO2 SERPL-SCNC: 24 MMOL/L — SIGNIFICANT CHANGE UP (ref 22–31)
CREAT SERPL-MCNC: 0.86 MG/DL — SIGNIFICANT CHANGE UP (ref 0.5–1.3)
EGFR: 68 ML/MIN/1.73M2 — SIGNIFICANT CHANGE UP
EOSINOPHIL # BLD AUTO: 0.06 K/UL — SIGNIFICANT CHANGE UP (ref 0–0.5)
EOSINOPHIL NFR BLD AUTO: 0.5 % — SIGNIFICANT CHANGE UP (ref 0–6)
GAS PNL BLDV: 130 MMOL/L — LOW (ref 136–145)
GAS PNL BLDV: SIGNIFICANT CHANGE UP
GAS PNL BLDV: SIGNIFICANT CHANGE UP
GLUCOSE BLDV-MCNC: 110 MG/DL — HIGH (ref 65–95)
GLUCOSE SERPL-MCNC: 107 MG/DL — HIGH (ref 70–99)
HCO3 BLDV-SCNC: 28 MMOL/L — SIGNIFICANT CHANGE UP (ref 22–28)
HCT VFR BLD CALC: 36.6 % — SIGNIFICANT CHANGE UP (ref 34.5–45)
HCT VFR BLDA CALC: 45 % — SIGNIFICANT CHANGE UP (ref 37–47)
HGB BLD CALC-MCNC: 14.9 G/DL — SIGNIFICANT CHANGE UP (ref 11.7–16.1)
HGB BLD-MCNC: 12.2 G/DL — SIGNIFICANT CHANGE UP (ref 11.5–15.5)
HOROWITZ INDEX BLDV+IHG-RTO: 21 — SIGNIFICANT CHANGE UP
IMM GRANULOCYTES NFR BLD AUTO: 0.4 % — SIGNIFICANT CHANGE UP (ref 0–0.9)
LACTATE BLDV-MCNC: 1.1 MMOL/L — SIGNIFICANT CHANGE UP (ref 0.56–1.39)
LYMPHOCYTES # BLD AUTO: 2.53 K/UL — SIGNIFICANT CHANGE UP (ref 1–3.3)
LYMPHOCYTES # BLD AUTO: 21.5 % — SIGNIFICANT CHANGE UP (ref 13–44)
MAGNESIUM SERPL-MCNC: 2.2 MG/DL — SIGNIFICANT CHANGE UP (ref 1.6–2.6)
MCHC RBC-ENTMCNC: 32.3 PG — SIGNIFICANT CHANGE UP (ref 27–34)
MCHC RBC-ENTMCNC: 33.3 G/DL — SIGNIFICANT CHANGE UP (ref 32–36)
MCV RBC AUTO: 96.8 FL — SIGNIFICANT CHANGE UP (ref 80–100)
MONOCYTES # BLD AUTO: 1.06 K/UL — HIGH (ref 0–0.9)
MONOCYTES NFR BLD AUTO: 9 % — SIGNIFICANT CHANGE UP (ref 2–14)
NEUTROPHILS # BLD AUTO: 8.05 K/UL — HIGH (ref 1.8–7.4)
NEUTROPHILS NFR BLD AUTO: 68.3 % — SIGNIFICANT CHANGE UP (ref 43–77)
NRBC # BLD: 0 /100 WBCS — SIGNIFICANT CHANGE UP (ref 0–0)
NT-PROBNP SERPL-SCNC: 2215 PG/ML — HIGH (ref 0–450)
PCO2 BLDV: 46 MMHG — SIGNIFICANT CHANGE UP (ref 42–55)
PH BLDV: 7.4 — SIGNIFICANT CHANGE UP (ref 7.32–7.43)
PLATELET # BLD AUTO: 164 K/UL — SIGNIFICANT CHANGE UP (ref 150–400)
PO2 BLDV: 37 MMHG — SIGNIFICANT CHANGE UP (ref 25–45)
POTASSIUM BLDV-SCNC: 3.8 MMOL/L — SIGNIFICANT CHANGE UP (ref 3.5–5.1)
POTASSIUM SERPL-MCNC: 4.2 MMOL/L — SIGNIFICANT CHANGE UP (ref 3.5–5.3)
POTASSIUM SERPL-SCNC: 4.2 MMOL/L — SIGNIFICANT CHANGE UP (ref 3.5–5.3)
PROT SERPL-MCNC: 7.8 GM/DL — SIGNIFICANT CHANGE UP (ref 6–8.3)
RAPID RVP RESULT: DETECTED
RBC # BLD: 3.78 M/UL — LOW (ref 3.8–5.2)
RBC # FLD: 13.6 % — SIGNIFICANT CHANGE UP (ref 10.3–14.5)
RSV RNA SPEC QL NAA+PROBE: DETECTED
SAO2 % BLDV: 64.6 % — LOW (ref 94–98)
SARS-COV-2 RNA SPEC QL NAA+PROBE: SIGNIFICANT CHANGE UP
SODIUM SERPL-SCNC: 134 MMOL/L — LOW (ref 135–145)
TROPONIN I, HIGH SENSITIVITY RESULT: 42.2 NG/L — SIGNIFICANT CHANGE UP
WBC # BLD: 11.79 K/UL — HIGH (ref 3.8–10.5)
WBC # FLD AUTO: 11.79 K/UL — HIGH (ref 3.8–10.5)

## 2022-11-23 PROCEDURE — 99285 EMERGENCY DEPT VISIT HI MDM: CPT

## 2022-11-23 PROCEDURE — 93010 ELECTROCARDIOGRAM REPORT: CPT

## 2022-11-23 PROCEDURE — 71045 X-RAY EXAM CHEST 1 VIEW: CPT | Mod: 26

## 2022-11-23 RX ORDER — ALBUTEROL 90 UG/1
2.5 AEROSOL, METERED ORAL ONCE
Refills: 0 | Status: COMPLETED | OUTPATIENT
Start: 2022-11-23 | End: 2022-11-23

## 2022-11-23 RX ORDER — ALBUTEROL 90 UG/1
2 AEROSOL, METERED ORAL
Qty: 1 | Refills: 0
Start: 2022-11-23 | End: 2022-12-22

## 2022-11-23 RX ORDER — AZITHROMYCIN 500 MG/1
1 TABLET, FILM COATED ORAL
Qty: 5 | Refills: 0
Start: 2022-11-23 | End: 2022-11-27

## 2022-11-23 RX ORDER — IPRATROPIUM/ALBUTEROL SULFATE 18-103MCG
3 AEROSOL WITH ADAPTER (GRAM) INHALATION
Refills: 0 | Status: COMPLETED | OUTPATIENT
Start: 2022-11-23 | End: 2022-11-23

## 2022-11-23 RX ADMIN — Medication 3 MILLILITER(S): at 13:15

## 2022-11-23 RX ADMIN — Medication 3 MILLILITER(S): at 13:33

## 2022-11-23 RX ADMIN — Medication 3 MILLILITER(S): at 13:30

## 2022-11-23 RX ADMIN — Medication 40 MILLIGRAM(S): at 15:38

## 2022-11-23 RX ADMIN — ALBUTEROL 2.5 MILLIGRAM(S): 90 AEROSOL, METERED ORAL at 15:38

## 2022-11-23 NOTE — ED PROVIDER NOTE - NS ED ATTENDING STATEMENT MOD
This was a shared visit with the FLAVIA. I reviewed and verified the documentation and independently performed the documented:

## 2022-11-23 NOTE — ED PROVIDER NOTE - ATTENDING APP SHARED VISIT CONTRIBUTION OF CARE
Attending Angelito: I performed a history and physical exam of the patient and discussed their management with the FLAVIA. I have reviewed the FLAVIA note and agree with the documented findings and plan of care, except as noted. This was a shared visit with an FLAVIA. I reviewed and verified the documentation and independently performed my own history/exam/medical decision making. My medical decision making and observations are found above. Please refer to any progress notes for updates on clinical course.

## 2022-11-23 NOTE — ED PROVIDER NOTE - NSFOLLOWUPINSTRUCTIONS_ED_ALL_ED_FT
You were seen in the emergency department for ___. Your discharge diagnosis is____.  Please read all attached patient information, read all additional instructions below, and follow-up with all providers as directed.    1) Follow-up with your primary care provider in 1-2 days.    2) Continue to take all medications as prescribed.  - azithromycin 500 mg: one tab per day for 5 days.  - prednisone 20 mg: two tabs per day for 5 days  - albuterol inhaler: 2 puffs every 1 hour for 2 to 3 doses, then every 2 to 4 hours, as needed;     3) Rest and stay hydrated. Pain can be managed with Acetaminophen (aka Tylenol) and Ibuprofen (aka Motrin or Advil) over the counter as directed.    4) Return to the ER for any new or worsening symptoms.      Please read all attached patient information.      Chronic Obstructive Pulmonary Disease Exacerbation       Chronic obstructive pulmonary disease (COPD) is a long-term (chronic) lung problem. In COPD, the flow of air from the lungs is limited.    COPD exacerbations are times that breathing gets worse and you need more than your normal treatment. Without treatment, they can be life-threatening. If they happen often, your lungs can become more damaged.      What are the causes?    •Having infections that affect your airways and lungs.    •Being exposed to:  •Smoke.      •Air pollution.      •Chemical fumes.      •Dust.      •Things that can cause an allergic reaction (allergens).        •Not taking your usual COPD medicines as told.      •Having medical problems already, such as heart failure or infections not involving the lungs.      In many cases, the cause is not known.      What increases the risk?    •Smoking.      •Being an older adult.      •Having frequent prior COPD exacerbations.        What are the signs or symptoms?    •Increased coughing.      •Increased mucus from your lungs.      •Increased wheezing.      •Increased shortness of breath.      •Fast breathing and finding it hard to breathe.      •Chest tightness.      •Less energy than usual.      •Sleep disruption from symptoms.      •Confusion.      •Increased sleepiness.      Often, these symptoms happen or get worse even with the use of medicines.      How is this treated?    Treatment for this condition depends on how bad it is and the cause of the symptoms. You may need to stay in the hospital for treatment. Treatment may include:  •Taking medicines.      •Using oxygen.      •Being treated with different ways to clear your airway, such as using a mask to deliver oxygen.        Follow these instructions at home:    Medicines     •Take over-the-counter and prescription medicines only as told by your doctor.      •Use all inhaled medicines the correct way.      •If you were prescribed an antibiotic or steroid medicine, take it as told by your doctor. Do not stop taking it even if you start to feel better.      Lifestyle     • Do not smoke or use any products that contain nicotine or tobacco. If you need help quitting, ask your doctor.      •Eat healthy foods.      •Exercise regularly.      •Get enough sleep. Most adults need 7 or more hours per night.      •Avoid tobacco smoke and other things that can bother your lungs.      •Several times a day, wash your hands with soap and water for at least 20 seconds. If you cannot use soap and water, use hand . This may help keep you from getting an infection.      •During flu season, avoid areas that are crowded with people.      General instructions     •Drink enough fluid to keep your pee (urine) pale yellow. Do not do this if your doctor has told you not to.      •Use a cool mist machine (vaporizer).      •If you use oxygen or a machine that turns medicine into a mist (nebulizer), continue to use it as told.      •Keep all follow-up visits.        How is this prevented?    •Keep up with shots (vaccinations) as told by your doctor. Be sure to get a yearly flu (influenza) shot.      •If you smoke, quit smoking. Smoking makes the problem worse.      •Follow all instructions for rehabilitation. These are steps you can take to make your body work better.      •Work with your doctor to develop and follow an action plan. This tells you what steps to take when you experience certain symptoms.        Contact a doctor if:    •Your COPD symptoms get worse than normal.        Get help right away if:    •You are short of breath and it gets worse, even when you are resting.      •You have trouble talking.      •You have chest pain.      •You cough up blood.      •You have a fever.      •You keep vomiting.      •You feel weak or you pass out (faint).      •You feel confused.      •You are not able to sleep because of your symptoms.      •You have trouble doing daily activities.      These symptoms may be an emergency. Get help right away. Call your local emergency services (911 in the [place]   • Do not wait to see if the symptoms will go away.        •  Do not drive yourself to the hospital.         Summary    •COPD exacerbations are times that breathing gets worse and you need more treatment than normal.      •COPD exacerbations can be very serious and may cause your lungs to become more damaged.      • Do not smoke. If you need help quitting, ask your doctor.      •Stay up to date on your shots. Get a flu shot every year.      This information is not intended to replace advice given to you by your health care provider. Make sure you discuss any questions you have with your health care provider.

## 2022-11-23 NOTE — ED PROVIDER NOTE - OBJECTIVE STATEMENT
82 y.o. female w/ PMHx of HTN, HLD, COPD, uterine CA (s/p hysterectomy 36 yrs ago), and hypothyroidism presents to the ED w/ c/o cough w/ SOB x 3 days.  Pt also endorsing fatigue and decreased appetite.  She reports cough is productive w/ yellow sputum.  Occasionally has chest pain from coughing so much.  Former smoker, quit at age 26.  No fever, chills, nausea, vomiting, abdominal pain, diarrhea, or dysuria.  No syncope but endorses intermittent dizziness.

## 2022-11-23 NOTE — ED ADULT NURSE NOTE - OBJECTIVE STATEMENT
pt presents to ed a&ox4  c/o sob, cough, congestion, chest pain, vomiting and fatigue for 3 days. daughter at bedside . endorsed sick contact with granddaughter. Denies fever or chills . history copd.

## 2022-11-23 NOTE — ED ADULT NURSE NOTE - NSICDXPASTMEDICALHX_GEN_ALL_CORE_FT
PAST MEDICAL HISTORY:  COPD, moderate     HLD (hyperlipidemia)     Hypertension     Hypothyroid

## 2022-11-23 NOTE — ED PROVIDER NOTE - CLINICAL SUMMARY MEDICAL DECISION MAKING FREE TEXT BOX
82 y.o. female w/ PMHx of HTN, HLD, COPD, uterine CA (s/p hysterectomy 36 yrs ago), and hypothyroidism presents to the ED w/ c/o cough w/ SOB x 3 days.  Associated symptoms include decreased appetite and fatigue.  No fever, vital signs stable.  On exam, bibasilar crackles w/ expiratory present.  Will obtain labs, cxr, and ekg to eval for ACS vs CHF vs. PNA.  VBG to assess CO2 to eval for potential COPD exacerbation.  Symptoms and exam not c/w PE.  Will give duonebs x 3, IVFs, and reassess. 82 y.o. female w/ PMHx of HTN, HLD, COPD, uterine CA (s/p hysterectomy 36 yrs ago), and hypothyroidism presents to the ED w/ c/o cough w/ SOB x 3 days.  Associated symptoms include decreased appetite and fatigue.  No fever, vital signs stable.  On exam, bibasilar crackles w/ expiratory wheezes present.  Will obtain labs, cxr, and ekg to eval for ACS vs CHF vs. PNA.  VBG to assess CO2 to eval for potential COPD exacerbation.  Symptoms and exam not c/w PE.  Will give duonebs x 3 and reassess.    81 y/o F w/ PMH as above presenting w/ cough and SOB. Agree w/ above documented HPI/ROS/PE/MDM. Pt overall no acute distress. Lungs w/ crackles and wheezes b/l. Suspect likely viral URI. Will eval for PNA. Possible superimposed COPD exacerbation. Eval for CHF as well. Plan for labs, imaging, EKG, meds. Monitor O2 sats. Will reassess the need for additional interventions as clinically warranted.

## 2022-11-23 NOTE — ED PROVIDER NOTE - CARE PLAN
1 Principal Discharge DX:	Viral upper respiratory tract infection with cough  Secondary Diagnosis:	COPD exacerbation

## 2022-11-23 NOTE — ED PROVIDER NOTE - PROGRESS NOTE DETAILS
MARCELO Bruno NP:  Reassessed pt.  Expiratory wheezes still present.  Will give one more neb. MARCELO Bruno NP:  Reassessed pt who looks improved, sitting upright.  Ambulation trial performed w/ portable pulse ox.  96% at baseline when standing with the lowest at 94%.  Pt able to tolerate ambulation up and down hallways.  Returned to bed, O2sat 96%. MARCELO Bruno NP:  all results and plan d/w patient.  Instructed pt on medications and how to take them. Return precautions provided.  Pt to be d/c home where she lives with her adult daughter, son-in-law and their adult children.  all questions answered.  Pt is stable and ready for d/c. will send meds directly to pt's pharmacy.

## 2022-11-23 NOTE — ED PROVIDER NOTE - PATIENT PORTAL LINK FT
You can access the FollowMyHealth Patient Portal offered by [facility] by registering at the following website: [email or web address]. By joining [facility]’s FollowMyHealth portal, you will also be able to view your health information using other applications (apps) compatible with our system.

## 2022-11-29 PROBLEM — J44.9 CHRONIC OBSTRUCTIVE PULMONARY DISEASE, UNSPECIFIED: Chronic | Status: ACTIVE | Noted: 2022-11-23

## 2022-11-30 ENCOUNTER — EMERGENCY (EMERGENCY)
Facility: HOSPITAL | Age: 82
LOS: 0 days | Discharge: HOME | End: 2022-12-01
Attending: STUDENT IN AN ORGANIZED HEALTH CARE EDUCATION/TRAINING PROGRAM

## 2022-11-30 VITALS
RESPIRATION RATE: 18 BRPM | SYSTOLIC BLOOD PRESSURE: 105 MMHG | HEIGHT: 61 IN | HEART RATE: 69 BPM | WEIGHT: 121.03 LBS | TEMPERATURE: 98 F | OXYGEN SATURATION: 96 % | DIASTOLIC BLOOD PRESSURE: 67 MMHG

## 2022-11-30 DIAGNOSIS — R52 PAIN, UNSPECIFIED: ICD-10-CM

## 2022-11-30 DIAGNOSIS — R53.83 OTHER FATIGUE: ICD-10-CM

## 2022-11-30 DIAGNOSIS — R06.02 SHORTNESS OF BREATH: ICD-10-CM

## 2022-11-30 DIAGNOSIS — E78.5 HYPERLIPIDEMIA, UNSPECIFIED: ICD-10-CM

## 2022-11-30 DIAGNOSIS — I10 ESSENTIAL (PRIMARY) HYPERTENSION: ICD-10-CM

## 2022-11-30 DIAGNOSIS — R53.1 WEAKNESS: ICD-10-CM
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
## 2022-11-30 DIAGNOSIS — Z90.710 ACQUIRED ABSENCE OF BOTH CERVIX AND UTERUS: ICD-10-CM

## 2022-11-30 DIAGNOSIS — R05.1 ACUTE COUGH: ICD-10-CM

## 2022-11-30 DIAGNOSIS — R63.0 ANOREXIA: ICD-10-CM

## 2022-11-30 DIAGNOSIS — E03.9 HYPOTHYROIDISM, UNSPECIFIED: ICD-10-CM

## 2022-11-30 DIAGNOSIS — Z87.891 PERSONAL HISTORY OF NICOTINE DEPENDENCE: ICD-10-CM
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
## 2022-11-30 DIAGNOSIS — S22.31XA FRACTURE OF ONE RIB, RIGHT SIDE, INITIAL ENCOUNTER FOR CLOSED FRACTURE: ICD-10-CM

## 2022-11-30 DIAGNOSIS — J44.9 CHRONIC OBSTRUCTIVE PULMONARY DISEASE, UNSPECIFIED: ICD-10-CM

## 2022-11-30 LAB
ALBUMIN SERPL ELPH-MCNC: 3.1 G/DL — LOW (ref 3.3–5)
ALP SERPL-CCNC: 69 U/L — SIGNIFICANT CHANGE UP (ref 40–120)
ALT FLD-CCNC: 35 U/L — SIGNIFICANT CHANGE UP (ref 12–78)
ANION GAP SERPL CALC-SCNC: 6 MMOL/L — SIGNIFICANT CHANGE UP (ref 5–17)
AST SERPL-CCNC: 32 U/L — SIGNIFICANT CHANGE UP (ref 15–37)
BILIRUB SERPL-MCNC: 0.6 MG/DL — SIGNIFICANT CHANGE UP (ref 0.2–1.2)
BUN SERPL-MCNC: 37 MG/DL — HIGH (ref 7–23)
CALCIUM SERPL-MCNC: 9.1 MG/DL — SIGNIFICANT CHANGE UP (ref 8.5–10.1)
CHLORIDE SERPL-SCNC: 101 MMOL/L — SIGNIFICANT CHANGE UP (ref 96–108)
CO2 SERPL-SCNC: 27 MMOL/L — SIGNIFICANT CHANGE UP (ref 22–31)
CREAT SERPL-MCNC: 1.16 MG/DL — SIGNIFICANT CHANGE UP (ref 0.5–1.3)
EGFR: 47 ML/MIN/1.73M2 — LOW
GLUCOSE SERPL-MCNC: 89 MG/DL — SIGNIFICANT CHANGE UP (ref 70–99)
HCT VFR BLD CALC: 39.7 % — SIGNIFICANT CHANGE UP (ref 34.5–45)
HGB BLD-MCNC: 13.6 G/DL — SIGNIFICANT CHANGE UP (ref 11.5–15.5)
LACTATE SERPL-SCNC: 1.2 MMOL/L — SIGNIFICANT CHANGE UP (ref 0.7–2)
MCHC RBC-ENTMCNC: 32.4 PG — SIGNIFICANT CHANGE UP (ref 27–34)
MCHC RBC-ENTMCNC: 34.3 G/DL — SIGNIFICANT CHANGE UP (ref 32–36)
MCV RBC AUTO: 94.5 FL — SIGNIFICANT CHANGE UP (ref 80–100)
PLATELET # BLD AUTO: 248 K/UL — SIGNIFICANT CHANGE UP (ref 150–400)
POTASSIUM SERPL-MCNC: 4.4 MMOL/L — SIGNIFICANT CHANGE UP (ref 3.5–5.3)
POTASSIUM SERPL-SCNC: 4.4 MMOL/L — SIGNIFICANT CHANGE UP (ref 3.5–5.3)
PROT SERPL-MCNC: 7.7 GM/DL — SIGNIFICANT CHANGE UP (ref 6–8.3)
RBC # BLD: 4.2 M/UL — SIGNIFICANT CHANGE UP (ref 3.8–5.2)
RBC # FLD: 13.2 % — SIGNIFICANT CHANGE UP (ref 10.3–14.5)
SODIUM SERPL-SCNC: 134 MMOL/L — LOW (ref 135–145)
WBC # BLD: 12.7 K/UL — HIGH (ref 3.8–10.5)
WBC # FLD AUTO: 12.7 K/UL — HIGH (ref 3.8–10.5)

## 2022-11-30 PROCEDURE — 71250 CT THORAX DX C-: CPT | Mod: 26,MA

## 2022-11-30 PROCEDURE — 99285 EMERGENCY DEPT VISIT HI MDM: CPT

## 2022-11-30 RX ORDER — SODIUM CHLORIDE 9 MG/ML
1000 INJECTION INTRAMUSCULAR; INTRAVENOUS; SUBCUTANEOUS ONCE
Refills: 0 | Status: COMPLETED | OUTPATIENT
Start: 2022-11-30 | End: 2022-11-30

## 2022-11-30 RX ADMIN — SODIUM CHLORIDE 1000 MILLILITER(S): 9 INJECTION INTRAMUSCULAR; INTRAVENOUS; SUBCUTANEOUS at 22:08

## 2022-11-30 NOTE — ED PROVIDER NOTE - PATIENT PORTAL LINK FT
You can access the FollowMyHealth Patient Portal offered by [facility] by registering at the following website: [email or web address]. By joining [facility]’s FollowMyHealth portal, you will also be able to view your health information using other applications (apps) compatible with our system.

## 2022-11-30 NOTE — ED PROVIDER NOTE - OBJECTIVE STATEMENT
82 year old female with h/o HTN, HLD, hypothyroidism, uterine cancer (s/p hysterectomy 36 years ago) and COPD presents today sent in by her PMD for evaluation, pt states that she was seen last week in the ER for cold symptoms productive cough and sob, pt reports having cough and congestion, fatigue and poor appetite, pt was treated and discharged diagnosed with viral uri and copd exacerbation, pt states that she went home not feeling well, two days ago reports tripping and falling onto her right side on her coffee table, she was seen at the urgent care and told to have 9thrib fracture, pt did go see her pmd due to cough and increased right side rib pain due to the cough and sob, now that her cough improved pt feels her rib pain also improved (-) fevers (-) nasuea or vomiting (-) abdominal pain

## 2022-11-30 NOTE — ED ADULT TRIAGE NOTE - CHIEF COMPLAINT QUOTE
s/p trip and fall onto coffee table x 3 days ago. c/o right flank pain worse with cough and sob. pt sent to ED for rib fx of 9th rib.

## 2022-11-30 NOTE — ED PROVIDER NOTE - CLINICAL SUMMARY MEDICAL DECISION MAKING FREE TEXT BOX
pt presents today with uri symptoms, productive cough, right rib pain due to recent right rib fracture, pt also reports poor appetite, bodyaches and generalized weakness, will obtain labs, ct chest to rule pna, hydrate, inhaler for cough and mild wheeze and reassess

## 2022-11-30 NOTE — ED PROVIDER NOTE - CONSTITUTIONAL, MLM
normal... Pt is awake, alert, oriented to person, place, time/situation, coughing at times, nontoxic/nonlethargic appearing, pt is in no respiratory distress

## 2022-11-30 NOTE — ED ADULT NURSE NOTE - OBJECTIVE STATEMENT
Pt aaox3. Pt s/p trip and fall onto coffee table x 3 days ago. Complaint of  right flank pain at 7/10 worse with cough and sob. Pt sent to ED for rib fx of 9th rib.

## 2022-11-30 NOTE — ED PROVIDER NOTE - NSFOLLOWUPINSTRUCTIONS_ED_ALL_ED_FT
You were seen in the ED for a right rib fracture.    You should continue to use tylenol or motrin at home every 6 hours for pain relief.    You should follow up with your primary care provider.    If you have worsening pain, difficulty breathing, cough, or other acute symptoms, you should return to the ED.

## 2022-11-30 NOTE — ED PROVIDER NOTE - PROGRESS NOTE DETAILS
CT with isolated rib fracture. Patient's vitals are stable. She is not hypoxic or requiring supplemental O2. Will discharge at this time with PMD f/u.

## 2022-12-01 VITALS
HEART RATE: 77 BPM | OXYGEN SATURATION: 97 % | TEMPERATURE: 98 F | RESPIRATION RATE: 18 BRPM | DIASTOLIC BLOOD PRESSURE: 66 MMHG | SYSTOLIC BLOOD PRESSURE: 150 MMHG

## 2022-12-01 LAB
BASOPHILS # BLD AUTO: 0 K/UL — SIGNIFICANT CHANGE UP (ref 0–0.2)
BASOPHILS NFR BLD AUTO: 0 % — SIGNIFICANT CHANGE UP (ref 0–2)
EOSINOPHIL # BLD AUTO: 0.25 K/UL — SIGNIFICANT CHANGE UP (ref 0–0.5)
EOSINOPHIL NFR BLD AUTO: 2 % — SIGNIFICANT CHANGE UP (ref 0–6)
LYMPHOCYTES # BLD AUTO: 40 % — SIGNIFICANT CHANGE UP (ref 13–44)
LYMPHOCYTES # BLD AUTO: 5.08 K/UL — HIGH (ref 1–3.3)
MANUAL SMEAR VERIFICATION: SIGNIFICANT CHANGE UP
METAMYELOCYTES # FLD: 2 % — HIGH (ref 0–0)
MONOCYTES # BLD AUTO: 0.25 K/UL — SIGNIFICANT CHANGE UP (ref 0–0.9)
MONOCYTES NFR BLD AUTO: 2 % — SIGNIFICANT CHANGE UP (ref 2–14)
MYELOCYTES NFR BLD: 2 % — HIGH (ref 0–0)
NEUTROPHILS # BLD AUTO: 5.84 K/UL — SIGNIFICANT CHANGE UP (ref 1.8–7.4)
NEUTROPHILS NFR BLD AUTO: 44 % — SIGNIFICANT CHANGE UP (ref 43–77)
NEUTS BAND # BLD: 2 % — SIGNIFICANT CHANGE UP (ref 0–8)
NRBC # BLD: 0 /100 — SIGNIFICANT CHANGE UP (ref 0–0)
NRBC # BLD: SIGNIFICANT CHANGE UP /100 WBCS (ref 0–0)
PLAT MORPH BLD: NORMAL — SIGNIFICANT CHANGE UP
RBC BLD AUTO: SIGNIFICANT CHANGE UP
VARIANT LYMPHS # BLD: 6 % — SIGNIFICANT CHANGE UP (ref 0–6)

## 2023-02-22 ENCOUNTER — RX RENEWAL (OUTPATIENT)
Age: 83
End: 2023-02-22

## 2023-03-10 ENCOUNTER — APPOINTMENT (OUTPATIENT)
Dept: PAIN MANAGEMENT | Facility: CLINIC | Age: 83
End: 2023-03-10
Payer: MEDICARE

## 2023-03-10 VITALS — WEIGHT: 126 LBS | BODY MASS INDEX: 23.79 KG/M2 | HEIGHT: 61 IN

## 2023-03-10 DIAGNOSIS — M54.50 LOW BACK PAIN, UNSPECIFIED: ICD-10-CM

## 2023-03-10 DIAGNOSIS — M79.10 MYALGIA, UNSPECIFIED SITE: ICD-10-CM

## 2023-03-10 DIAGNOSIS — M47.816 SPONDYLOSIS W/OUT MYELOPATHY OR RADICULOPATHY, LUMBAR REGION: ICD-10-CM

## 2023-03-10 PROCEDURE — 99203 OFFICE O/P NEW LOW 30 MIN: CPT

## 2023-03-10 NOTE — HISTORY OF PRESENT ILLNESS
[Lower back] : lower back [8] : 8 [10] : 10 [Constant] : constant [Household chores] : household chores [Nothing helps with pain getting better] : Nothing helps with pain getting better [Standing] : standing [Walking] : walking [FreeTextEntry1] : Initial HPI 03/10/23: \par Pain started a few months ago and is on the BILATERAL lower back described achy/stiff pain worse with walking and getting up out of bed. No radiation down the legs. Has trouble getting out of bed. \par \par Xray Lumbar spine 2/15/23: levoscoliosis; multilevel DDD and spondylosis with progression of degenerative changes at the L2-3 level. \par MRI Lumbar Spine: none \par Conservative Care: none \par Pain Medications: tylenol, asa back and body \par Past Injections: none\par Spine surgery: none \par Blood thinners: none\par  [] : This patient has had an injection before: no [FreeTextEntry6] : SORENESS  [de-identified] : x ray

## 2023-03-10 NOTE — DISCUSSION/SUMMARY
[de-identified] : After discussing various treatment options with the patient including but not limited to oral medications, physical therapy, exercise modalities as well as interventional spinal injections, we have decided with the following plan:\par \par - Continue home exercises, stretching, activity modification, physical therapy, and conservative care.\par - Follow-up as needed.\par - Will provide prescription for Physical Therapy.\par - Recommend Tylenol 500-1000mg Q8 hours PRN.\par

## 2023-03-10 NOTE — PHYSICAL EXAM

## 2023-03-20 ENCOUNTER — RX RENEWAL (OUTPATIENT)
Age: 83
End: 2023-03-20

## 2023-03-31 ENCOUNTER — NON-APPOINTMENT (OUTPATIENT)
Age: 83
End: 2023-03-31

## 2023-03-31 ENCOUNTER — APPOINTMENT (OUTPATIENT)
Dept: CARDIOLOGY | Facility: CLINIC | Age: 83
End: 2023-03-31
Payer: MEDICARE

## 2023-03-31 VITALS
SYSTOLIC BLOOD PRESSURE: 137 MMHG | BODY MASS INDEX: 23.79 KG/M2 | WEIGHT: 126 LBS | HEIGHT: 61 IN | OXYGEN SATURATION: 98 % | DIASTOLIC BLOOD PRESSURE: 79 MMHG | HEART RATE: 56 BPM

## 2023-03-31 DIAGNOSIS — E03.9 HYPOTHYROIDISM, UNSPECIFIED: ICD-10-CM

## 2023-03-31 PROCEDURE — 93000 ELECTROCARDIOGRAM COMPLETE: CPT

## 2023-03-31 PROCEDURE — 99214 OFFICE O/P EST MOD 30 MIN: CPT | Mod: 25

## 2023-03-31 RX ORDER — LEVOTHYROXINE SODIUM 0.05 MG/1
50 TABLET ORAL
Refills: 0 | Status: ACTIVE | COMMUNITY

## 2023-03-31 NOTE — ASSESSMENT
[FreeTextEntry1] : Impression:\par Asymptomatic CAD\par Hypertension\par \par Plan:\par No change in medical therapy at this time.

## 2023-03-31 NOTE — CARDIOLOGY SUMMARY
[de-identified] : 2/16/2022:\par Hyperdynamic left ventricle. No significant valvular abnormalities.  [de-identified] : Sinus bradycardia @ 56/min. Nonspecific T-wave abnormality.  [de-identified] : February 2022: Hyperdynamic LV. No valvular abnormalities.

## 2023-03-31 NOTE — REASON FOR VISIT
[FreeTextEntry1] : 82 year old woman who has a history of coronary artery disease. normal (hyperdynamic) LV systolic function, and hypertension for which she is treated with amlodipine + atorvastatin. \par \par Ms. Ma continues to do all of her activities on her own - including all of her shopping. She is active without limitations, except for occasional dyspnea when ascending stairs. She has no history of any chest pain syndrome.

## 2023-03-31 NOTE — HISTORY OF PRESENT ILLNESS
[FreeTextEntry1] : does her own shopping\par walking everywhere , at least 2-3 miles per week\par lifts things\par occasional dyspnea when ascending stairs

## 2023-03-31 NOTE — CARDIOLOGY SUMMARY
[de-identified] : 2/16/2022:\par Hyperdynamic left ventricle. No significant valvular abnormalities.  [de-identified] : Sinus bradycardia @ 56/min. Nonspecific T-wave abnormality.  [de-identified] : February 2022: Hyperdynamic LV. No valvular abnormalities.

## 2023-03-31 NOTE — PHYSICAL EXAM
[No Carotid Bruit] : no carotid bruit [Normal S1, S2] : normal S1, S2 [No Murmur] : no murmur [Normal] : alert and oriented, normal memory

## 2023-04-16 ENCOUNTER — RX RENEWAL (OUTPATIENT)
Age: 83
End: 2023-04-16

## 2023-04-21 ENCOUNTER — APPOINTMENT (OUTPATIENT)
Dept: PAIN MANAGEMENT | Facility: CLINIC | Age: 83
End: 2023-04-21

## 2023-05-15 ENCOUNTER — RX RENEWAL (OUTPATIENT)
Age: 83
End: 2023-05-15

## 2023-05-15 RX ORDER — AMLODIPINE BESYLATE 2.5 MG/1
2.5 TABLET ORAL DAILY
Qty: 30 | Refills: 11 | Status: ACTIVE | COMMUNITY
Start: 2022-03-15 | End: 1900-01-01

## 2023-08-19 ENCOUNTER — NON-APPOINTMENT (OUTPATIENT)
Age: 83
End: 2023-08-19

## 2023-09-14 NOTE — ED PROVIDER NOTE - MDM ORDERS SUBMITTED SELECTION
[address] [place] [id number]  Discharge Contact Phone Number: [phone]    [address] [place] [address]  Discharge Contact Phone Number: [phone]         Labs/EKG/Imaging Studies/Medications

## 2023-10-05 ENCOUNTER — EMERGENCY (EMERGENCY)
Facility: HOSPITAL | Age: 83
LOS: 1 days | Discharge: HOME | End: 2023-10-05
Attending: STUDENT IN AN ORGANIZED HEALTH CARE EDUCATION/TRAINING PROGRAM
Payer: MEDICARE

## 2023-10-05 VITALS
WEIGHT: 126.1 LBS | HEIGHT: 61 IN | HEART RATE: 62 BPM | RESPIRATION RATE: 18 BRPM | OXYGEN SATURATION: 98 % | SYSTOLIC BLOOD PRESSURE: 139 MMHG | DIASTOLIC BLOOD PRESSURE: 62 MMHG | TEMPERATURE: 98 F

## 2023-10-05 VITALS
TEMPERATURE: 98 F | HEART RATE: 60 BPM | OXYGEN SATURATION: 97 % | DIASTOLIC BLOOD PRESSURE: 77 MMHG | SYSTOLIC BLOOD PRESSURE: 147 MMHG | RESPIRATION RATE: 18 BRPM

## 2023-10-05 DIAGNOSIS — I10 ESSENTIAL (PRIMARY) HYPERTENSION: ICD-10-CM

## 2023-10-05 DIAGNOSIS — E03.9 HYPOTHYROIDISM, UNSPECIFIED: ICD-10-CM

## 2023-10-05 DIAGNOSIS — H81.10 BENIGN PAROXYSMAL VERTIGO, UNSPECIFIED EAR: ICD-10-CM

## 2023-10-05 DIAGNOSIS — R42 DIZZINESS AND GIDDINESS: ICD-10-CM

## 2023-10-05 DIAGNOSIS — R11.0 NAUSEA: ICD-10-CM

## 2023-10-05 DIAGNOSIS — J44.9 CHRONIC OBSTRUCTIVE PULMONARY DISEASE, UNSPECIFIED: ICD-10-CM

## 2023-10-05 DIAGNOSIS — R00.1 BRADYCARDIA, UNSPECIFIED: ICD-10-CM

## 2023-10-05 DIAGNOSIS — Z87.440 PERSONAL HISTORY OF URINARY (TRACT) INFECTIONS: ICD-10-CM

## 2023-10-05 DIAGNOSIS — E78.5 HYPERLIPIDEMIA, UNSPECIFIED: ICD-10-CM

## 2023-10-05 LAB
ALBUMIN SERPL ELPH-MCNC: 3.1 G/DL — LOW (ref 3.3–5)
ALP SERPL-CCNC: 55 U/L — SIGNIFICANT CHANGE UP (ref 40–120)
ALT FLD-CCNC: 31 U/L — SIGNIFICANT CHANGE UP (ref 12–78)
ANION GAP SERPL CALC-SCNC: 4 MMOL/L — LOW (ref 5–17)
AST SERPL-CCNC: 27 U/L — SIGNIFICANT CHANGE UP (ref 15–37)
BASOPHILS # BLD AUTO: 0.07 K/UL — SIGNIFICANT CHANGE UP (ref 0–0.2)
BASOPHILS NFR BLD AUTO: 0.9 % — SIGNIFICANT CHANGE UP (ref 0–2)
BILIRUB SERPL-MCNC: 0.5 MG/DL — SIGNIFICANT CHANGE UP (ref 0.2–1.2)
BUN SERPL-MCNC: 16 MG/DL — SIGNIFICANT CHANGE UP (ref 7–23)
CALCIUM SERPL-MCNC: 8.9 MG/DL — SIGNIFICANT CHANGE UP (ref 8.5–10.1)
CHLORIDE SERPL-SCNC: 105 MMOL/L — SIGNIFICANT CHANGE UP (ref 96–108)
CO2 SERPL-SCNC: 29 MMOL/L — SIGNIFICANT CHANGE UP (ref 22–31)
CREAT SERPL-MCNC: 0.97 MG/DL — SIGNIFICANT CHANGE UP (ref 0.5–1.3)
EGFR: 58 ML/MIN/1.73M2 — LOW
EOSINOPHIL # BLD AUTO: 0.43 K/UL — SIGNIFICANT CHANGE UP (ref 0–0.5)
EOSINOPHIL NFR BLD AUTO: 5.7 % — SIGNIFICANT CHANGE UP (ref 0–6)
GLUCOSE SERPL-MCNC: 101 MG/DL — HIGH (ref 70–99)
HCT VFR BLD CALC: 35.4 % — SIGNIFICANT CHANGE UP (ref 34.5–45)
HGB BLD-MCNC: 11.6 G/DL — SIGNIFICANT CHANGE UP (ref 11.5–15.5)
IMM GRANULOCYTES NFR BLD AUTO: 0.7 % — SIGNIFICANT CHANGE UP (ref 0–0.9)
LYMPHOCYTES # BLD AUTO: 2.58 K/UL — SIGNIFICANT CHANGE UP (ref 1–3.3)
LYMPHOCYTES # BLD AUTO: 33.9 % — SIGNIFICANT CHANGE UP (ref 13–44)
MCHC RBC-ENTMCNC: 31.9 PG — SIGNIFICANT CHANGE UP (ref 27–34)
MCHC RBC-ENTMCNC: 32.8 G/DL — SIGNIFICANT CHANGE UP (ref 32–36)
MCV RBC AUTO: 97.3 FL — SIGNIFICANT CHANGE UP (ref 80–100)
MONOCYTES # BLD AUTO: 0.92 K/UL — HIGH (ref 0–0.9)
MONOCYTES NFR BLD AUTO: 12.1 % — SIGNIFICANT CHANGE UP (ref 2–14)
NEUTROPHILS # BLD AUTO: 3.55 K/UL — SIGNIFICANT CHANGE UP (ref 1.8–7.4)
NEUTROPHILS NFR BLD AUTO: 46.7 % — SIGNIFICANT CHANGE UP (ref 43–77)
NRBC # BLD: 0 /100 WBCS — SIGNIFICANT CHANGE UP (ref 0–0)
PLATELET # BLD AUTO: 244 K/UL — SIGNIFICANT CHANGE UP (ref 150–400)
POTASSIUM SERPL-MCNC: 3.7 MMOL/L — SIGNIFICANT CHANGE UP (ref 3.5–5.3)
POTASSIUM SERPL-SCNC: 3.7 MMOL/L — SIGNIFICANT CHANGE UP (ref 3.5–5.3)
PROT SERPL-MCNC: 7.6 GM/DL — SIGNIFICANT CHANGE UP (ref 6–8.3)
RBC # BLD: 3.64 M/UL — LOW (ref 3.8–5.2)
RBC # FLD: 13.1 % — SIGNIFICANT CHANGE UP (ref 10.3–14.5)
SODIUM SERPL-SCNC: 138 MMOL/L — SIGNIFICANT CHANGE UP (ref 135–145)
WBC # BLD: 7.6 K/UL — SIGNIFICANT CHANGE UP (ref 3.8–10.5)
WBC # FLD AUTO: 7.6 K/UL — SIGNIFICANT CHANGE UP (ref 3.8–10.5)

## 2023-10-05 PROCEDURE — 99284 EMERGENCY DEPT VISIT MOD MDM: CPT

## 2023-10-05 PROCEDURE — 93010 ELECTROCARDIOGRAM REPORT: CPT

## 2023-10-05 RX ORDER — ONDANSETRON 8 MG/1
1 TABLET, FILM COATED ORAL
Qty: 1 | Refills: 0
Start: 2023-10-05 | End: 2023-10-07

## 2023-10-05 RX ORDER — MECLIZINE HCL 12.5 MG
1 TABLET ORAL
Qty: 15 | Refills: 0
Start: 2023-10-05 | End: 2023-10-09

## 2023-10-05 RX ORDER — ONDANSETRON 8 MG/1
4 TABLET, FILM COATED ORAL ONCE
Refills: 0 | Status: COMPLETED | OUTPATIENT
Start: 2023-10-05 | End: 2023-10-05

## 2023-10-05 RX ORDER — MECLIZINE HCL 12.5 MG
25 TABLET ORAL ONCE
Refills: 0 | Status: COMPLETED | OUTPATIENT
Start: 2023-10-05 | End: 2023-10-05

## 2023-10-05 RX ORDER — SODIUM CHLORIDE 9 MG/ML
1000 INJECTION INTRAMUSCULAR; INTRAVENOUS; SUBCUTANEOUS ONCE
Refills: 0 | Status: COMPLETED | OUTPATIENT
Start: 2023-10-05 | End: 2023-10-05

## 2023-10-05 RX ADMIN — Medication 25 MILLIGRAM(S): at 17:44

## 2023-10-05 RX ADMIN — SODIUM CHLORIDE 1000 MILLILITER(S): 9 INJECTION INTRAMUSCULAR; INTRAVENOUS; SUBCUTANEOUS at 17:39

## 2023-10-05 RX ADMIN — ONDANSETRON 4 MILLIGRAM(S): 8 TABLET, FILM COATED ORAL at 17:39

## 2023-10-05 NOTE — ED ADULT NURSE NOTE - ED STAT RN HANDOFF DETAILS
Report received from VIOLA De Oliveira. Safety checks completed this shift. Safety rounds completed hourly.  IV sites checked Q2+remains WDL.  Fall & skin precautions in place. Any issues endorsed to VIOLA De Oliveira for follow up. no acute distress noted. pt A&Ox4, ambulatory with steady gait.

## 2023-10-05 NOTE — ED PROVIDER NOTE - NS ED ROS FT
General: Denies fever, chills  HEENT: Denies sensory changes, sore throat  Neck: Denies neck pain, neck stiffness  Resp: Denies coughing, SOB  Cardiovascular: Denies CP, palpitations, LE edema  GI: Denies abdominal pain, diarrhea, constipation, blood in stool; + nausea, vomiting  : Denies dysuria, hematuria, frequency, incontinence  MSK: Denies back pain  Neuro: + dizziness, weakness  Skin: Denies rashes.

## 2023-10-05 NOTE — ED PROVIDER NOTE - OBJECTIVE STATEMENT
83 F pmh HTN, Hypothyroid, frequent uti presenting to the ED for intermittent dizziness x 1 week. Patient has been experiencing intermittent room-spinning sensation that is worse when waking up a/w nausea. She denies headache, chest pain, shortness of breath, or difficulty urinating. She has had frequent UTIs and she has been seen by her urologist. She had a recent contrast study done and she was started on antibiotics (levofloxacin 500mg daily). She has been taking her antibiotics for the past 2 days. She was seen by her primary doctor in the past for similar symptoms and she was given dramamine but she stopped taking the medication due to it causing sleepiness.

## 2023-10-05 NOTE — ED PROVIDER NOTE - PATIENT PORTAL LINK FT
You can access the FollowMyHealth Patient Portal offered by [facility] by registering at the following website: [email or web address]. By joining [email or web address]’s FollowMyHealth portal, you will also be able to view your health information using other applications (apps) compatible with our system.

## 2023-10-05 NOTE — ED PROVIDER NOTE - NSFOLLOWUPINSTRUCTIONS_ED_ALL_ED_FT
Dizziness    Dizziness can manifest as a feeling of unsteadiness or light-headedness. You may feel like you are about to faint. This condition can be caused by a number of things, including medicines, dehydration, or illness. Drink enough fluid to keep your urine clear or pale yellow. Do not drink alcohol and limit your caffeine intake. Avoid quick or sudden movements.  Rise slowly from chairs and steady yourself until you feel okay. In the morning, first sit up on the side of the bed.    SEEK IMMEDIATE MEDICAL CARE IF YOU HAVE ANY OF THE FOLLOWING SYMPTOMS: vomiting, changes in your vision or speech, weakness in your arms or legs, trouble speaking or swallowing, chest pain, abdominal pain, shortness of breath, sweating, bleeding, headache, neck pain, or fever.    If your symptoms continue to persist and do not alleviate with medications then return to the ED. Please stay hydrated and follow-up with your primary doctor and urologist

## 2023-10-05 NOTE — ED PROVIDER NOTE - CLINICAL SUMMARY MEDICAL DECISION MAKING FREE TEXT BOX
83 F presenting w/ intermittent dizziness, consistent w/ positional vertigo  able to elicit symptoms on exam but no nystagmus present    symptoms alleviated w/ fluids, meclizine  dispo home w/ pmd follow-up 83 F presenting w/ intermittent dizziness, consistent w/ positional vertigo  able to elicit symptoms on exam but no nystagmus present    symptoms alleviated w/ fluids, meclizine  dispo home w/ pmd follow-up    no need for urine currently as patient will continue to follow with urologist. pt w/o urinary symptoms and already on abx (levofloxacin)

## 2023-10-05 NOTE — ED PROVIDER NOTE - FAMILY DETAILS FREE TEXT FOR MDM ADDL HISTORY OBTAINED FROM QUESTION
daughter: states patient had cystogram done yesterday but patient has had intermittent room spinning sensation for the past 2 days. Patient has not been hydrated as per daughter.

## 2023-10-05 NOTE — ED ADULT NURSE REASSESSMENT NOTE - NS ED NURSE REASSESS COMMENT FT1
Discharge instructions provided and verbalizes understanding of medication regimen and follow up care. Educational material provided. Denies any pain at this time. no acute distress noted. Respirations even and unlabored. pt ambulatory with steady gait, pt denies s/s related to dizziness. daughter to take pt home.

## 2023-10-05 NOTE — ED PROVIDER NOTE - PHYSICAL EXAMINATION
General: Well appearing elderly female in no acute distress  HEENT: Normocephalic, atraumatic. Moist mucous membranes. Oropharynx clear. No lymphadenopathy.  Eyes: No scleral icterus. EOMI. EPIFANIO.  Neck:. Soft and supple. Full ROM without pain. No midline tenderness  Cardiac: Regular rate and regular rhythm. No murmurs, rubs, gallops. Peripheral pulses 2+ and symmetric. No LE edema.  Resp: Lungs CTAB. Speaking in full sentences. No wheezes, rales or rhonchi.  Abd: Soft, non-tender, non-distended. No guarding or rebound. No scars, masses, or lesions.  Back: Spine midline and non-tender. No CVA tenderness.    Skin: No rashes, abrasions, or lacerations.  Neuro: AO x 3. Moves all extremities symmetrically. Motor strength and sensation grossly intact. negative romberg. CN II-XII grossly intact. ambulatory w/ steady gait. able to tandem gait

## 2023-10-05 NOTE — ED ADULT NURSE REASSESSMENT NOTE - NS ED NURSE REASSESS COMMENT FT1
Daughter requesting to speak with Dr. Garcia regarding prescribed home medication for patient. As per Daughter, pharmacy states possible interaction between prescribed Zofran and pt current home medication of Levofloxacin. Dr. Garcia informed and daughter brought to speak with Dr. Garcia. As per Dr. Garcia, ok for pt ot take both medications. pt instructed by Dr. Garcia to take Zofran as needed and not to be taken at a regular interval if not needed. Daughter verbalizes understanding and agreement with instructions.

## 2023-10-05 NOTE — ED PROVIDER NOTE - CONSIDERATION OF ADMISSION OBSERVATION
patient asymptomatic after medications and hydration  ambulatory w/ steady gait  patient safe for discharge w/ outpatient follow-up w/ continuing of medications Consideration of Admission/Observation

## 2023-10-05 NOTE — ED ADULT TRIAGE NOTE - CHIEF COMPLAINT QUOTE
Patient c/o dizziness "I feel like the room is spinning" with associated nausea X 1 week. Denies CP/SOB. Being treated for reoccurring UTI, with levofloxacin 500mg. No neuro defects noted, BRIDGES without drifts or defects.

## 2023-10-05 NOTE — ED ADULT NURSE NOTE - NSFALLRISKINTERV_ED_ALL_ED
Communicate fall risk and risk factors to all staff, patient, and family/Provide patient with walking aids/Provide visual cue: yellow wristband, yellow gown, etc/Reinforce activity limits and safety measures with patient and family/Call bell, personal items and telephone in reach/Instruct patient to call for assistance before getting out of bed/chair/stretcher/Non-slip footwear applied when patient is off stretcher/Shirley to call system/Physically safe environment - no spills, clutter or unnecessary equipment/Purposeful Proactive Rounding/Room/bathroom lighting operational, light cord in reach

## 2023-10-29 ENCOUNTER — NON-APPOINTMENT (OUTPATIENT)
Age: 83
End: 2023-10-29

## 2023-12-29 ENCOUNTER — NON-APPOINTMENT (OUTPATIENT)
Age: 83
End: 2023-12-29

## 2024-03-02 ENCOUNTER — NON-APPOINTMENT (OUTPATIENT)
Age: 84
End: 2024-03-02

## 2024-08-23 NOTE — ED CDU PROVIDER DISPOSITION NOTE - NS_CDULENGTHOFSTAY_ED_A_ED
3 Hour(s) 1 Minute(s) Quality 47: Advance Care Plan: Advance Care Planning discussed and documented; advance care plan or surrogate decision maker documented in the medical record. Detail Level: Detailed Quality 226: Preventive Care And Screening: Tobacco Use: Screening And Cessation Intervention: Patient screened for tobacco use and is an ex/non-smoker

## 2025-07-04 ENCOUNTER — INPATIENT (INPATIENT)
Facility: HOSPITAL | Age: 85
LOS: 0 days | Discharge: HOME | End: 2025-07-05
Attending: INTERNAL MEDICINE | Admitting: INTERNAL MEDICINE
Payer: MEDICARE

## 2025-07-04 VITALS
WEIGHT: 149.91 LBS | HEART RATE: 70 BPM | SYSTOLIC BLOOD PRESSURE: 176 MMHG | DIASTOLIC BLOOD PRESSURE: 78 MMHG | HEIGHT: 64 IN | OXYGEN SATURATION: 100 % | TEMPERATURE: 98 F

## 2025-07-04 DIAGNOSIS — E78.5 HYPERLIPIDEMIA, UNSPECIFIED: ICD-10-CM

## 2025-07-04 DIAGNOSIS — F41.9 ANXIETY DISORDER, UNSPECIFIED: ICD-10-CM
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
## 2025-07-04 DIAGNOSIS — I10 ESSENTIAL (PRIMARY) HYPERTENSION: ICD-10-CM

## 2025-07-04 DIAGNOSIS — S22.42XA MULTIPLE FRACTURES OF RIBS, LEFT SIDE, INITIAL ENCOUNTER FOR CLOSED FRACTURE: ICD-10-CM

## 2025-07-04 DIAGNOSIS — E03.9 HYPOTHYROIDISM, UNSPECIFIED: ICD-10-CM

## 2025-07-04 DIAGNOSIS — Z29.9 ENCOUNTER FOR PROPHYLACTIC MEASURES, UNSPECIFIED: ICD-10-CM

## 2025-07-04 LAB
ALBUMIN SERPL ELPH-MCNC: 3.3 G/DL — SIGNIFICANT CHANGE UP (ref 3.3–5)
ALP SERPL-CCNC: 77 U/L — SIGNIFICANT CHANGE UP (ref 40–120)
ALT FLD-CCNC: 22 U/L — SIGNIFICANT CHANGE UP (ref 12–78)
ANION GAP SERPL CALC-SCNC: 10 MMOL/L — SIGNIFICANT CHANGE UP (ref 5–17)
AST SERPL-CCNC: 33 U/L — SIGNIFICANT CHANGE UP (ref 15–37)
BASOPHILS # BLD AUTO: 0.05 K/UL — SIGNIFICANT CHANGE UP (ref 0–0.2)
BASOPHILS NFR BLD AUTO: 0.6 % — SIGNIFICANT CHANGE UP (ref 0–2)
BILIRUB SERPL-MCNC: 0.7 MG/DL — SIGNIFICANT CHANGE UP (ref 0.2–1.2)
BUN SERPL-MCNC: 23 MG/DL — SIGNIFICANT CHANGE UP (ref 7–23)
CALCIUM SERPL-MCNC: 8.5 MG/DL — SIGNIFICANT CHANGE UP (ref 8.5–10.1)
CHLORIDE SERPL-SCNC: 103 MMOL/L — SIGNIFICANT CHANGE UP (ref 96–108)
CO2 SERPL-SCNC: 21 MMOL/L — LOW (ref 22–31)
CREAT SERPL-MCNC: 0.89 MG/DL — SIGNIFICANT CHANGE UP (ref 0.5–1.3)
EGFR: 64 ML/MIN/1.73M2 — SIGNIFICANT CHANGE UP
EGFR: 64 ML/MIN/1.73M2 — SIGNIFICANT CHANGE UP
EOSINOPHIL # BLD AUTO: 0.3 K/UL — SIGNIFICANT CHANGE UP (ref 0–0.5)
EOSINOPHIL NFR BLD AUTO: 3.6 % — SIGNIFICANT CHANGE UP (ref 0–6)
GLUCOSE SERPL-MCNC: 130 MG/DL — HIGH (ref 70–99)
HCT VFR BLD CALC: 34.1 % — LOW (ref 34.5–45)
HGB BLD-MCNC: 11.5 G/DL — SIGNIFICANT CHANGE UP (ref 11.5–15.5)
IMM GRANULOCYTES NFR BLD AUTO: 0.6 % — SIGNIFICANT CHANGE UP (ref 0–0.9)
LYMPHOCYTES # BLD AUTO: 2.04 K/UL — SIGNIFICANT CHANGE UP (ref 1–3.3)
LYMPHOCYTES # BLD AUTO: 24.2 % — SIGNIFICANT CHANGE UP (ref 13–44)
MCHC RBC-ENTMCNC: 32.9 PG — SIGNIFICANT CHANGE UP (ref 27–34)
MCHC RBC-ENTMCNC: 33.7 G/DL — SIGNIFICANT CHANGE UP (ref 32–36)
MCV RBC AUTO: 97.4 FL — SIGNIFICANT CHANGE UP (ref 80–100)
MONOCYTES # BLD AUTO: 0.91 K/UL — HIGH (ref 0–0.9)
MONOCYTES NFR BLD AUTO: 10.8 % — SIGNIFICANT CHANGE UP (ref 2–14)
NEUTROPHILS # BLD AUTO: 5.08 K/UL — SIGNIFICANT CHANGE UP (ref 1.8–7.4)
NEUTROPHILS NFR BLD AUTO: 60.2 % — SIGNIFICANT CHANGE UP (ref 43–77)
NRBC BLD AUTO-RTO: 0 /100 WBCS — SIGNIFICANT CHANGE UP (ref 0–0)
PLATELET # BLD AUTO: 176 K/UL — SIGNIFICANT CHANGE UP (ref 150–400)
POTASSIUM SERPL-MCNC: 4 MMOL/L — SIGNIFICANT CHANGE UP (ref 3.5–5.3)
POTASSIUM SERPL-SCNC: 4 MMOL/L — SIGNIFICANT CHANGE UP (ref 3.5–5.3)
PROT SERPL-MCNC: 8 GM/DL — SIGNIFICANT CHANGE UP (ref 6–8.3)
RBC # BLD: 3.5 M/UL — LOW (ref 3.8–5.2)
RBC # FLD: 14.2 % — SIGNIFICANT CHANGE UP (ref 10.3–14.5)
SODIUM SERPL-SCNC: 134 MMOL/L — LOW (ref 135–145)
WBC # BLD: 8.43 K/UL — SIGNIFICANT CHANGE UP (ref 3.8–10.5)
WBC # FLD AUTO: 8.43 K/UL — SIGNIFICANT CHANGE UP (ref 3.8–10.5)

## 2025-07-04 PROCEDURE — 93010 ELECTROCARDIOGRAM REPORT: CPT

## 2025-07-04 PROCEDURE — 99285 EMERGENCY DEPT VISIT HI MDM: CPT

## 2025-07-04 PROCEDURE — 70450 CT HEAD/BRAIN W/O DYE: CPT | Mod: 26

## 2025-07-04 PROCEDURE — 71250 CT THORAX DX C-: CPT | Mod: 26

## 2025-07-04 PROCEDURE — 99222 1ST HOSP IP/OBS MODERATE 55: CPT

## 2025-07-04 RX ORDER — ACETAMINOPHEN 500 MG/5ML
650 LIQUID (ML) ORAL EVERY 6 HOURS
Refills: 0 | Status: DISCONTINUED | OUTPATIENT
Start: 2025-07-04 | End: 2025-07-05

## 2025-07-04 RX ORDER — SENNA 187 MG
2 TABLET ORAL AT BEDTIME
Refills: 0 | Status: DISCONTINUED | OUTPATIENT
Start: 2025-07-04 | End: 2025-07-05

## 2025-07-04 RX ORDER — LOPERAMIDE HCL 1 MG/7.5ML
2 SOLUTION ORAL DAILY
Refills: 0 | Status: DISCONTINUED | OUTPATIENT
Start: 2025-07-04 | End: 2025-07-05

## 2025-07-04 RX ORDER — ONDANSETRON HCL/PF 4 MG/2 ML
4 VIAL (ML) INJECTION ONCE
Refills: 0 | Status: COMPLETED | OUTPATIENT
Start: 2025-07-04 | End: 2025-07-04

## 2025-07-04 RX ORDER — LEVOTHYROXINE SODIUM 300 MCG
50 TABLET ORAL DAILY
Refills: 0 | Status: DISCONTINUED | OUTPATIENT
Start: 2025-07-04 | End: 2025-07-05

## 2025-07-04 RX ORDER — AMLODIPINE BESYLATE 10 MG/1
0 TABLET ORAL
Qty: 0 | Refills: 2 | DISCHARGE

## 2025-07-04 RX ORDER — KETOROLAC TROMETHAMINE 30 MG/ML
15 INJECTION, SOLUTION INTRAMUSCULAR; INTRAVENOUS EVERY 4 HOURS
Refills: 0 | Status: DISCONTINUED | OUTPATIENT
Start: 2025-07-04 | End: 2025-07-05

## 2025-07-04 RX ORDER — ATORVASTATIN CALCIUM 80 MG/1
20 TABLET, FILM COATED ORAL AT BEDTIME
Refills: 0 | Status: DISCONTINUED | OUTPATIENT
Start: 2025-07-04 | End: 2025-07-05

## 2025-07-04 RX ORDER — MAGNESIUM, ALUMINUM HYDROXIDE 200-200 MG
30 TABLET,CHEWABLE ORAL EVERY 4 HOURS
Refills: 0 | Status: DISCONTINUED | OUTPATIENT
Start: 2025-07-04 | End: 2025-07-05

## 2025-07-04 RX ORDER — ESCITALOPRAM OXALATE 20 MG/1
0 TABLET ORAL
Qty: 0 | Refills: 0 | DISCHARGE

## 2025-07-04 RX ORDER — ATORVASTATIN CALCIUM 80 MG/1
0 TABLET, FILM COATED ORAL
Qty: 0 | Refills: 0 | DISCHARGE

## 2025-07-04 RX ORDER — ACETAMINOPHEN 500 MG/5ML
1000 LIQUID (ML) ORAL ONCE
Refills: 0 | Status: COMPLETED | OUTPATIENT
Start: 2025-07-04 | End: 2025-07-04

## 2025-07-04 RX ORDER — LEVOTHYROXINE SODIUM 300 MCG
0 TABLET ORAL
Qty: 0 | Refills: 0 | DISCHARGE

## 2025-07-04 RX ORDER — ESCITALOPRAM OXALATE 20 MG/1
5 TABLET ORAL DAILY
Refills: 0 | Status: DISCONTINUED | OUTPATIENT
Start: 2025-07-04 | End: 2025-07-05

## 2025-07-04 RX ORDER — MELATONIN 5 MG
3 TABLET ORAL AT BEDTIME
Refills: 0 | Status: DISCONTINUED | OUTPATIENT
Start: 2025-07-04 | End: 2025-07-05

## 2025-07-04 RX ORDER — AMLODIPINE BESYLATE 10 MG/1
2.5 TABLET ORAL DAILY
Refills: 0 | Status: DISCONTINUED | OUTPATIENT
Start: 2025-07-04 | End: 2025-07-05

## 2025-07-04 RX ORDER — ONDANSETRON HCL/PF 4 MG/2 ML
4 VIAL (ML) INJECTION EVERY 8 HOURS
Refills: 0 | Status: DISCONTINUED | OUTPATIENT
Start: 2025-07-04 | End: 2025-07-05

## 2025-07-04 RX ORDER — OXYCODONE HYDROCHLORIDE 30 MG/1
2.5 TABLET ORAL EVERY 6 HOURS
Refills: 0 | Status: DISCONTINUED | OUTPATIENT
Start: 2025-07-04 | End: 2025-07-04

## 2025-07-04 RX ORDER — LISINOPRIL 30 MG/1
50 TABLET ORAL DAILY
Refills: 0 | Status: DISCONTINUED | OUTPATIENT
Start: 2025-07-04 | End: 2025-07-05

## 2025-07-04 RX ORDER — LOPERAMIDE HCL 1 MG/7.5ML
1 SOLUTION ORAL
Refills: 0 | DISCHARGE

## 2025-07-04 RX ORDER — OXYCODONE HYDROCHLORIDE 30 MG/1
2.5 TABLET ORAL EVERY 6 HOURS
Refills: 0 | Status: DISCONTINUED | OUTPATIENT
Start: 2025-07-04 | End: 2025-07-05

## 2025-07-04 RX ORDER — LIDOCAINE HYDROCHLORIDE 20 MG/ML
2 JELLY TOPICAL DAILY
Refills: 0 | Status: DISCONTINUED | OUTPATIENT
Start: 2025-07-04 | End: 2025-07-05

## 2025-07-04 RX ORDER — LISINOPRIL 30 MG/1
0 TABLET ORAL
Qty: 0 | Refills: 1 | DISCHARGE

## 2025-07-04 RX ADMIN — ESCITALOPRAM OXALATE 5 MILLIGRAM(S): 20 TABLET ORAL at 19:19

## 2025-07-04 RX ADMIN — ATORVASTATIN CALCIUM 20 MILLIGRAM(S): 80 TABLET, FILM COATED ORAL at 22:05

## 2025-07-04 RX ADMIN — LISINOPRIL 50 MILLIGRAM(S): 30 TABLET ORAL at 19:20

## 2025-07-04 RX ADMIN — Medication 2 TABLET(S): at 22:05

## 2025-07-04 RX ADMIN — Medication 400 MILLIGRAM(S): at 13:32

## 2025-07-04 RX ADMIN — Medication 650 MILLIGRAM(S): at 17:31

## 2025-07-04 RX ADMIN — Medication 2 MILLIGRAM(S): at 13:32

## 2025-07-04 RX ADMIN — Medication 4 MILLIGRAM(S): at 15:29

## 2025-07-04 RX ADMIN — LIDOCAINE HYDROCHLORIDE 2 PATCH: 20 JELLY TOPICAL at 19:20

## 2025-07-05 ENCOUNTER — TRANSCRIPTION ENCOUNTER (OUTPATIENT)
Age: 85
End: 2025-07-05

## 2025-07-05 VITALS
HEART RATE: 62 BPM | TEMPERATURE: 98 F | RESPIRATION RATE: 17 BRPM | SYSTOLIC BLOOD PRESSURE: 117 MMHG | OXYGEN SATURATION: 97 % | DIASTOLIC BLOOD PRESSURE: 64 MMHG

## 2025-07-05 LAB
HCT VFR BLD CALC: 38.6 % — SIGNIFICANT CHANGE UP (ref 34.5–45)
HGB BLD-MCNC: 13.2 G/DL — SIGNIFICANT CHANGE UP (ref 11.5–15.5)
MCHC RBC-ENTMCNC: 32 PG — SIGNIFICANT CHANGE UP (ref 27–34)
MCHC RBC-ENTMCNC: 34.2 G/DL — SIGNIFICANT CHANGE UP (ref 32–36)
MCV RBC AUTO: 93.7 FL — SIGNIFICANT CHANGE UP (ref 80–100)
NRBC BLD AUTO-RTO: 0 /100 WBCS — SIGNIFICANT CHANGE UP (ref 0–0)
PLATELET # BLD AUTO: 207 K/UL — SIGNIFICANT CHANGE UP (ref 150–400)
RBC # BLD: 4.12 M/UL — SIGNIFICANT CHANGE UP (ref 3.8–5.2)
RBC # FLD: 13.8 % — SIGNIFICANT CHANGE UP (ref 10.3–14.5)
WBC # BLD: 9.1 K/UL — SIGNIFICANT CHANGE UP (ref 3.8–10.5)
WBC # FLD AUTO: 9.1 K/UL — SIGNIFICANT CHANGE UP (ref 3.8–10.5)

## 2025-07-05 PROCEDURE — 99222 1ST HOSP IP/OBS MODERATE 55: CPT

## 2025-07-05 PROCEDURE — 99221 1ST HOSP IP/OBS SF/LOW 40: CPT

## 2025-07-05 PROCEDURE — 99239 HOSP IP/OBS DSCHRG MGMT >30: CPT

## 2025-07-05 PROCEDURE — 71045 X-RAY EXAM CHEST 1 VIEW: CPT | Mod: 26

## 2025-07-05 RX ORDER — LIDOCAINE HYDROCHLORIDE 20 MG/ML
1 JELLY TOPICAL
Qty: 15 | Refills: 0
Start: 2025-07-05 | End: 2025-07-19

## 2025-07-05 RX ORDER — ACETAMINOPHEN 500 MG/5ML
2 LIQUID (ML) ORAL
Qty: 0 | Refills: 0 | DISCHARGE
Start: 2025-07-05

## 2025-07-05 RX ORDER — OXYCODONE HYDROCHLORIDE AND ACETAMINOPHEN 10; 325 MG/1; MG/1
1 TABLET ORAL
Qty: 20 | Refills: 0
Start: 2025-07-05 | End: 2025-07-09

## 2025-07-05 RX ADMIN — AMLODIPINE BESYLATE 2.5 MILLIGRAM(S): 10 TABLET ORAL at 06:01

## 2025-07-05 RX ADMIN — Medication 50 MICROGRAM(S): at 06:01

## 2025-07-05 RX ADMIN — LISINOPRIL 50 MILLIGRAM(S): 30 TABLET ORAL at 06:01
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
## 2025-07-09 DIAGNOSIS — Z87.891 PERSONAL HISTORY OF NICOTINE DEPENDENCE: ICD-10-CM

## 2025-07-09 DIAGNOSIS — Z90.710 ACQUIRED ABSENCE OF BOTH CERVIX AND UTERUS: ICD-10-CM

## 2025-07-09 DIAGNOSIS — Z79.899 OTHER LONG TERM (CURRENT) DRUG THERAPY: ICD-10-CM

## 2025-07-09 DIAGNOSIS — E78.5 HYPERLIPIDEMIA, UNSPECIFIED: ICD-10-CM

## 2025-07-09 DIAGNOSIS — F41.9 ANXIETY DISORDER, UNSPECIFIED: ICD-10-CM

## 2025-07-09 DIAGNOSIS — Z85.42 PERSONAL HISTORY OF MALIGNANT NEOPLASM OF OTHER PARTS OF UTERUS: ICD-10-CM

## 2025-07-09 DIAGNOSIS — S22.42XA MULTIPLE FRACTURES OF RIBS, LEFT SIDE, INITIAL ENCOUNTER FOR CLOSED FRACTURE: ICD-10-CM

## 2025-07-09 DIAGNOSIS — I10 ESSENTIAL (PRIMARY) HYPERTENSION: ICD-10-CM

## 2025-07-09 DIAGNOSIS — Z79.890 HORMONE REPLACEMENT THERAPY: ICD-10-CM

## 2025-07-09 DIAGNOSIS — E03.9 HYPOTHYROIDISM, UNSPECIFIED: ICD-10-CM

## 2025-07-31 ENCOUNTER — APPOINTMENT (OUTPATIENT)
Dept: ORTHOPEDIC SURGERY | Facility: CLINIC | Age: 85
End: 2025-07-31
Payer: MEDICARE

## 2025-07-31 DIAGNOSIS — M75.02 ADHESIVE CAPSULITIS OF LEFT SHOULDER: ICD-10-CM

## 2025-07-31 PROCEDURE — 99203 OFFICE O/P NEW LOW 30 MIN: CPT

## 2025-07-31 PROCEDURE — 73030 X-RAY EXAM OF SHOULDER: CPT | Mod: LT
